# Patient Record
Sex: MALE
[De-identification: names, ages, dates, MRNs, and addresses within clinical notes are randomized per-mention and may not be internally consistent; named-entity substitution may affect disease eponyms.]

---

## 2017-06-27 PROBLEM — Z00.00 ENCOUNTER FOR PREVENTIVE HEALTH EXAMINATION: Status: ACTIVE | Noted: 2017-06-27

## 2017-07-10 ENCOUNTER — APPOINTMENT (OUTPATIENT)
Dept: HEMATOLOGY ONCOLOGY | Facility: CLINIC | Age: 82
End: 2017-07-10

## 2017-07-10 ENCOUNTER — OUTPATIENT (OUTPATIENT)
Dept: OUTPATIENT SERVICES | Facility: HOSPITAL | Age: 82
LOS: 1 days | Discharge: HOME | End: 2017-07-10

## 2017-07-10 DIAGNOSIS — C25.9 MALIGNANT NEOPLASM OF PANCREAS, UNSPECIFIED: ICD-10-CM

## 2017-07-11 ENCOUNTER — RESULT REVIEW (OUTPATIENT)
Age: 82
End: 2017-07-11

## 2017-07-14 ENCOUNTER — INPATIENT (INPATIENT)
Facility: HOSPITAL | Age: 82
LOS: 4 days | Discharge: HOME | End: 2017-07-19
Attending: INTERNAL MEDICINE | Admitting: INTERNAL MEDICINE
Payer: MEDICARE

## 2017-07-14 VITALS
OXYGEN SATURATION: 97 % | DIASTOLIC BLOOD PRESSURE: 65 MMHG | SYSTOLIC BLOOD PRESSURE: 127 MMHG | HEART RATE: 125 BPM | TEMPERATURE: 98 F | RESPIRATION RATE: 20 BRPM

## 2017-07-14 NOTE — ED ADULT TRIAGE NOTE - CHIEF COMPLAINT QUOTE
Pt arrives from home accompanied by his son. Pt states he was diagnosed with a blood clot in his left leg approx 1 month ago and was admitted to [facility] for a 5 day inpatient stay for same. Pt has been taking injectable Lovenox daily since discharge; states this past Wednesday left leg began swelling as well as left foot. Pt has tried elevating said extremity but swelling persists and has increased. Pts states he called pts PMD today and was told to go to ER to r/o blood clot in left leg. Pt denies pain; pts leg warm and dry with +pulse. Pt arrives from home accompanied by his son. Pt states he was diagnosed with a blood clot in his left leg approx 1 month ago and was admitted to [facility] for a 5 day inpatient stay for same. Pt has been taking injectable Lovenox daily since discharge; states this past Wednesday left leg began swelling as well as left foot. Pt has tried elevating said extremity but swelling persists and has increased. Pts states he called pts PMD today and was told to go to ER to r/o blood clot in left leg. Pt denies pain; pts leg warm and dry with +pulse. Pts son also states pt was diagnosed with stage IV pancreatic cancer last month.

## 2017-07-15 DIAGNOSIS — I26.99 OTHER PULMONARY EMBOLISM WITHOUT ACUTE COR PULMONALE: ICD-10-CM

## 2017-07-15 DIAGNOSIS — E11.9 TYPE 2 DIABETES MELLITUS WITHOUT COMPLICATIONS: ICD-10-CM

## 2017-07-15 DIAGNOSIS — H40.9 UNSPECIFIED GLAUCOMA: ICD-10-CM

## 2017-07-15 DIAGNOSIS — I82.409 ACUTE EMBOLISM AND THROMBOSIS OF UNSPECIFIED DEEP VEINS OF UNSPECIFIED LOWER EXTREMITY: ICD-10-CM

## 2017-07-15 DIAGNOSIS — I10 ESSENTIAL (PRIMARY) HYPERTENSION: ICD-10-CM

## 2017-07-15 DIAGNOSIS — C25.9 MALIGNANT NEOPLASM OF PANCREAS, UNSPECIFIED: ICD-10-CM

## 2017-07-15 DIAGNOSIS — E78.5 HYPERLIPIDEMIA, UNSPECIFIED: ICD-10-CM

## 2017-07-15 LAB
ALBUMIN SERPL ELPH-MCNC: 2.5 G/DL — LOW (ref 3.3–5)
ALBUMIN SERPL ELPH-MCNC: 2.7 G/DL — LOW (ref 3.3–5)
ALP SERPL-CCNC: 179 U/L — HIGH (ref 40–120)
ALP SERPL-CCNC: 184 U/L — HIGH (ref 40–120)
ALT FLD-CCNC: 24 U/L — SIGNIFICANT CHANGE UP (ref 4–41)
ALT FLD-CCNC: 25 U/L — SIGNIFICANT CHANGE UP (ref 4–41)
APPEARANCE UR: CLEAR — SIGNIFICANT CHANGE UP
APTT BLD: 29.8 SEC — SIGNIFICANT CHANGE UP (ref 27.5–37.4)
APTT BLD: 44.8 SEC — HIGH (ref 27.5–37.4)
APTT BLD: 50.5 SEC — HIGH (ref 27.5–37.4)
AST SERPL-CCNC: 28 U/L — SIGNIFICANT CHANGE UP (ref 4–40)
AST SERPL-CCNC: 28 U/L — SIGNIFICANT CHANGE UP (ref 4–40)
BASOPHILS # BLD AUTO: 0 K/UL — SIGNIFICANT CHANGE UP (ref 0–0.2)
BASOPHILS NFR BLD AUTO: 0 % — SIGNIFICANT CHANGE UP (ref 0–2)
BILIRUB SERPL-MCNC: 1 MG/DL — SIGNIFICANT CHANGE UP (ref 0.2–1.2)
BILIRUB SERPL-MCNC: 1.1 MG/DL — SIGNIFICANT CHANGE UP (ref 0.2–1.2)
BILIRUB UR-MCNC: NEGATIVE — SIGNIFICANT CHANGE UP
BLOOD UR QL VISUAL: SIGNIFICANT CHANGE UP
BUN SERPL-MCNC: 20 MG/DL — SIGNIFICANT CHANGE UP (ref 7–23)
BUN SERPL-MCNC: 22 MG/DL — SIGNIFICANT CHANGE UP (ref 7–23)
CALCIUM SERPL-MCNC: 8.5 MG/DL — SIGNIFICANT CHANGE UP (ref 8.4–10.5)
CALCIUM SERPL-MCNC: 8.8 MG/DL — SIGNIFICANT CHANGE UP (ref 8.4–10.5)
CHLORIDE SERPL-SCNC: 91 MMOL/L — LOW (ref 98–107)
CHLORIDE SERPL-SCNC: 94 MMOL/L — LOW (ref 98–107)
CHOLEST SERPL-MCNC: 103 MG/DL — LOW (ref 120–199)
CK MB BLD-MCNC: 1.65 NG/ML — SIGNIFICANT CHANGE UP (ref 1–6.6)
CK MB BLD-MCNC: 2.28 NG/ML — SIGNIFICANT CHANGE UP (ref 1–6.6)
CK MB BLD-MCNC: SIGNIFICANT CHANGE UP (ref 0–2.5)
CK MB BLD-MCNC: SIGNIFICANT CHANGE UP (ref 0–2.5)
CK SERPL-CCNC: 16 U/L — LOW (ref 30–200)
CK SERPL-CCNC: 29 U/L — LOW (ref 30–200)
CO2 SERPL-SCNC: 26 MMOL/L — SIGNIFICANT CHANGE UP (ref 22–31)
CO2 SERPL-SCNC: 29 MMOL/L — SIGNIFICANT CHANGE UP (ref 22–31)
COLOR SPEC: YELLOW — SIGNIFICANT CHANGE UP
CREAT SERPL-MCNC: 0.83 MG/DL — SIGNIFICANT CHANGE UP (ref 0.5–1.3)
CREAT SERPL-MCNC: 1.05 MG/DL — SIGNIFICANT CHANGE UP (ref 0.5–1.3)
EOSINOPHIL # BLD AUTO: 0 K/UL — SIGNIFICANT CHANGE UP (ref 0–0.5)
EOSINOPHIL NFR BLD AUTO: 0 % — SIGNIFICANT CHANGE UP (ref 0–6)
GLUCOSE SERPL-MCNC: 173 MG/DL — HIGH (ref 70–99)
GLUCOSE SERPL-MCNC: 278 MG/DL — HIGH (ref 70–99)
GLUCOSE UR-MCNC: NEGATIVE — SIGNIFICANT CHANGE UP
HBA1C BLD-MCNC: 8.1 % — HIGH (ref 4–5.6)
HCT VFR BLD CALC: 28 % — LOW (ref 39–50)
HCT VFR BLD CALC: 28.6 % — LOW (ref 39–50)
HCT VFR BLD CALC: 30.9 % — LOW (ref 39–50)
HDLC SERPL-MCNC: 30 MG/DL — LOW (ref 35–55)
HGB BLD-MCNC: 10 G/DL — LOW (ref 13–17)
HGB BLD-MCNC: 9 G/DL — LOW (ref 13–17)
HGB BLD-MCNC: 9.1 G/DL — LOW (ref 13–17)
IMM GRANULOCYTES # BLD AUTO: 0.11 # — SIGNIFICANT CHANGE UP
IMM GRANULOCYTES NFR BLD AUTO: 1.1 % — SIGNIFICANT CHANGE UP (ref 0–1.5)
INR BLD: 1.29 — HIGH (ref 0.88–1.17)
KETONES UR-MCNC: NEGATIVE — SIGNIFICANT CHANGE UP
LEUKOCYTE ESTERASE UR-ACNC: NEGATIVE — SIGNIFICANT CHANGE UP
LIPID PNL WITH DIRECT LDL SERPL: 57 MG/DL — SIGNIFICANT CHANGE UP
LYMPHOCYTES # BLD AUTO: 0.37 K/UL — LOW (ref 1–3.3)
LYMPHOCYTES # BLD AUTO: 3.8 % — LOW (ref 13–44)
MAGNESIUM SERPL-MCNC: 2.4 MG/DL — SIGNIFICANT CHANGE UP (ref 1.6–2.6)
MCHC RBC-ENTMCNC: 28.8 PG — SIGNIFICANT CHANGE UP (ref 27–34)
MCHC RBC-ENTMCNC: 29.5 PG — SIGNIFICANT CHANGE UP (ref 27–34)
MCHC RBC-ENTMCNC: 30.1 PG — SIGNIFICANT CHANGE UP (ref 27–34)
MCHC RBC-ENTMCNC: 31.5 % — LOW (ref 32–36)
MCHC RBC-ENTMCNC: 32.4 % — SIGNIFICANT CHANGE UP (ref 32–36)
MCHC RBC-ENTMCNC: 32.5 % — SIGNIFICANT CHANGE UP (ref 32–36)
MCV RBC AUTO: 90.9 FL — SIGNIFICANT CHANGE UP (ref 80–100)
MCV RBC AUTO: 91.4 FL — SIGNIFICANT CHANGE UP (ref 80–100)
MCV RBC AUTO: 93.1 FL — SIGNIFICANT CHANGE UP (ref 80–100)
MONOCYTES # BLD AUTO: 0.65 K/UL — SIGNIFICANT CHANGE UP (ref 0–0.9)
MONOCYTES NFR BLD AUTO: 6.7 % — SIGNIFICANT CHANGE UP (ref 2–14)
MUCOUS THREADS # UR AUTO: SIGNIFICANT CHANGE UP
NEUTROPHILS # BLD AUTO: 8.55 K/UL — HIGH (ref 1.8–7.4)
NEUTROPHILS NFR BLD AUTO: 88.4 % — HIGH (ref 43–77)
NITRITE UR-MCNC: NEGATIVE — SIGNIFICANT CHANGE UP
NRBC # FLD: 0 — SIGNIFICANT CHANGE UP
PH UR: 5.5 — SIGNIFICANT CHANGE UP (ref 4.6–8)
PHOSPHATE SERPL-MCNC: 4 MG/DL — SIGNIFICANT CHANGE UP (ref 2.5–4.5)
PLATELET # BLD AUTO: 233 K/UL — SIGNIFICANT CHANGE UP (ref 150–400)
PLATELET # BLD AUTO: 239 K/UL — SIGNIFICANT CHANGE UP (ref 150–400)
PLATELET # BLD AUTO: 266 K/UL — SIGNIFICANT CHANGE UP (ref 150–400)
PMV BLD: 9.6 FL — SIGNIFICANT CHANGE UP (ref 7–13)
PMV BLD: 9.7 FL — SIGNIFICANT CHANGE UP (ref 7–13)
PMV BLD: 9.8 FL — SIGNIFICANT CHANGE UP (ref 7–13)
POTASSIUM SERPL-MCNC: 3.8 MMOL/L — SIGNIFICANT CHANGE UP (ref 3.5–5.3)
POTASSIUM SERPL-MCNC: 4 MMOL/L — SIGNIFICANT CHANGE UP (ref 3.5–5.3)
POTASSIUM SERPL-SCNC: 3.8 MMOL/L — SIGNIFICANT CHANGE UP (ref 3.5–5.3)
POTASSIUM SERPL-SCNC: 4 MMOL/L — SIGNIFICANT CHANGE UP (ref 3.5–5.3)
PROT SERPL-MCNC: 5.6 G/DL — LOW (ref 6–8.3)
PROT SERPL-MCNC: 6.5 G/DL — SIGNIFICANT CHANGE UP (ref 6–8.3)
PROT UR-MCNC: 10 — SIGNIFICANT CHANGE UP
PROTHROM AB SERPL-ACNC: 14.5 SEC — HIGH (ref 9.8–13.1)
RBC # BLD: 3.08 M/UL — LOW (ref 4.2–5.8)
RBC # BLD: 3.13 M/UL — LOW (ref 4.2–5.8)
RBC # BLD: 3.32 M/UL — LOW (ref 4.2–5.8)
RBC # FLD: 12.9 % — SIGNIFICANT CHANGE UP (ref 10.3–14.5)
RBC # FLD: 13 % — SIGNIFICANT CHANGE UP (ref 10.3–14.5)
RBC # FLD: 13.1 % — SIGNIFICANT CHANGE UP (ref 10.3–14.5)
RBC CASTS # UR COMP ASSIST: HIGH (ref 0–?)
SODIUM SERPL-SCNC: 132 MMOL/L — LOW (ref 135–145)
SODIUM SERPL-SCNC: 137 MMOL/L — SIGNIFICANT CHANGE UP (ref 135–145)
SP GR SPEC: 1.02 — SIGNIFICANT CHANGE UP (ref 1–1.03)
T3 SERPL-MCNC: 123 NG/DL — SIGNIFICANT CHANGE UP (ref 80–200)
T4 FREE SERPL-MCNC: 1.78 NG/DL — SIGNIFICANT CHANGE UP (ref 0.9–1.8)
TRIGL SERPL-MCNC: 67 MG/DL — SIGNIFICANT CHANGE UP (ref 10–149)
TROPONIN T SERPL-MCNC: < 0.06 NG/ML — SIGNIFICANT CHANGE UP (ref 0–0.06)
TROPONIN T SERPL-MCNC: < 0.06 NG/ML — SIGNIFICANT CHANGE UP (ref 0–0.06)
TSH SERPL-MCNC: 0.01 UIU/ML — LOW (ref 0.27–4.2)
UROBILINOGEN FLD QL: NORMAL E.U. — SIGNIFICANT CHANGE UP (ref 0.1–0.2)
WBC # BLD: 8.75 K/UL — SIGNIFICANT CHANGE UP (ref 3.8–10.5)
WBC # BLD: 9.49 K/UL — SIGNIFICANT CHANGE UP (ref 3.8–10.5)
WBC # BLD: 9.68 K/UL — SIGNIFICANT CHANGE UP (ref 3.8–10.5)
WBC # FLD AUTO: 8.75 K/UL — SIGNIFICANT CHANGE UP (ref 3.8–10.5)
WBC # FLD AUTO: 9.49 K/UL — SIGNIFICANT CHANGE UP (ref 3.8–10.5)
WBC # FLD AUTO: 9.68 K/UL — SIGNIFICANT CHANGE UP (ref 3.8–10.5)
WBC UR QL: SIGNIFICANT CHANGE UP (ref 0–?)

## 2017-07-15 PROCEDURE — 99223 1ST HOSP IP/OBS HIGH 75: CPT | Mod: GC

## 2017-07-15 PROCEDURE — 74177 CT ABD & PELVIS W/CONTRAST: CPT | Mod: 26

## 2017-07-15 PROCEDURE — 73701 CT LOWER EXTREMITY W/DYE: CPT | Mod: 26,LT

## 2017-07-15 PROCEDURE — 71275 CT ANGIOGRAPHY CHEST: CPT | Mod: 26

## 2017-07-15 PROCEDURE — 70470 CT HEAD/BRAIN W/O & W/DYE: CPT | Mod: 26

## 2017-07-15 RX ORDER — HEPARIN SODIUM 5000 [USP'U]/ML
3000 INJECTION INTRAVENOUS; SUBCUTANEOUS EVERY 6 HOURS
Qty: 0 | Refills: 0 | Status: DISCONTINUED | OUTPATIENT
Start: 2017-07-15 | End: 2017-07-16

## 2017-07-15 RX ORDER — INSULIN GLARGINE 100 [IU]/ML
28 INJECTION, SOLUTION SUBCUTANEOUS AT BEDTIME
Qty: 0 | Refills: 0 | Status: DISCONTINUED | OUTPATIENT
Start: 2017-07-15 | End: 2017-07-15

## 2017-07-15 RX ORDER — OXYCODONE AND ACETAMINOPHEN 5; 325 MG/1; MG/1
1 TABLET ORAL EVERY 8 HOURS
Qty: 0 | Refills: 0 | Status: DISCONTINUED | OUTPATIENT
Start: 2017-07-15 | End: 2017-07-19

## 2017-07-15 RX ORDER — INSULIN LISPRO 100/ML
6 VIAL (ML) SUBCUTANEOUS
Qty: 0 | Refills: 0 | Status: DISCONTINUED | OUTPATIENT
Start: 2017-07-15 | End: 2017-07-16

## 2017-07-15 RX ORDER — PANTOPRAZOLE SODIUM 20 MG/1
40 TABLET, DELAYED RELEASE ORAL
Qty: 0 | Refills: 0 | Status: DISCONTINUED | OUTPATIENT
Start: 2017-07-15 | End: 2017-07-19

## 2017-07-15 RX ORDER — INSULIN LISPRO 100/ML
VIAL (ML) SUBCUTANEOUS AT BEDTIME
Qty: 0 | Refills: 0 | Status: DISCONTINUED | OUTPATIENT
Start: 2017-07-15 | End: 2017-07-19

## 2017-07-15 RX ORDER — INSULIN DETEMIR 100/ML (3)
28 INSULIN PEN (ML) SUBCUTANEOUS
Qty: 0 | Refills: 0 | COMMUNITY

## 2017-07-15 RX ORDER — SODIUM CHLORIDE 9 MG/ML
1000 INJECTION, SOLUTION INTRAVENOUS
Qty: 0 | Refills: 0 | Status: DISCONTINUED | OUTPATIENT
Start: 2017-07-15 | End: 2017-07-19

## 2017-07-15 RX ORDER — MORPHINE SULFATE 50 MG/1
30 CAPSULE, EXTENDED RELEASE ORAL EVERY 12 HOURS
Qty: 0 | Refills: 0 | Status: DISCONTINUED | OUTPATIENT
Start: 2017-07-15 | End: 2017-07-16

## 2017-07-15 RX ORDER — HEPARIN SODIUM 5000 [USP'U]/ML
INJECTION INTRAVENOUS; SUBCUTANEOUS
Qty: 25000 | Refills: 0 | Status: DISCONTINUED | OUTPATIENT
Start: 2017-07-15 | End: 2017-07-16

## 2017-07-15 RX ORDER — LISINOPRIL 2.5 MG/1
10 TABLET ORAL DAILY
Qty: 0 | Refills: 0 | Status: DISCONTINUED | OUTPATIENT
Start: 2017-07-15 | End: 2017-07-19

## 2017-07-15 RX ORDER — BRIMONIDINE TARTRATE, TIMOLOL MALEATE 2; 5 MG/ML; MG/ML
1 SOLUTION/ DROPS OPHTHALMIC
Qty: 0 | Refills: 0 | COMMUNITY

## 2017-07-15 RX ORDER — ACETAZOLAMIDE 250 MG/1
500 TABLET ORAL DAILY
Qty: 0 | Refills: 0 | Status: DISCONTINUED | OUTPATIENT
Start: 2017-07-15 | End: 2017-07-16

## 2017-07-15 RX ORDER — PILOCARPINE HCL 4 %
2 DROPS OPHTHALMIC (EYE)
Qty: 0 | Refills: 0 | COMMUNITY

## 2017-07-15 RX ORDER — DEXTROSE 50 % IN WATER 50 %
25 SYRINGE (ML) INTRAVENOUS ONCE
Qty: 0 | Refills: 0 | Status: DISCONTINUED | OUTPATIENT
Start: 2017-07-15 | End: 2017-07-19

## 2017-07-15 RX ORDER — MORPHINE SULFATE 50 MG/1
1 CAPSULE, EXTENDED RELEASE ORAL
Qty: 0 | Refills: 0 | COMMUNITY

## 2017-07-15 RX ORDER — LINACLOTIDE 145 UG/1
1 CAPSULE, GELATIN COATED ORAL
Qty: 0 | Refills: 0 | COMMUNITY

## 2017-07-15 RX ORDER — TIMOLOL 0.5 %
1 DROPS OPHTHALMIC (EYE)
Qty: 0 | Refills: 0 | Status: DISCONTINUED | OUTPATIENT
Start: 2017-07-15 | End: 2017-07-16

## 2017-07-15 RX ORDER — DOXAZOSIN MESYLATE 4 MG
2 TABLET ORAL AT BEDTIME
Qty: 0 | Refills: 0 | Status: DISCONTINUED | OUTPATIENT
Start: 2017-07-15 | End: 2017-07-19

## 2017-07-15 RX ORDER — METOCLOPRAMIDE HCL 10 MG
10 TABLET ORAL THREE TIMES A DAY
Qty: 0 | Refills: 0 | Status: DISCONTINUED | OUTPATIENT
Start: 2017-07-15 | End: 2017-07-18

## 2017-07-15 RX ORDER — BRIMONIDINE TARTRATE 2 MG/MG
1 SOLUTION/ DROPS OPHTHALMIC
Qty: 0 | Refills: 0 | COMMUNITY

## 2017-07-15 RX ORDER — DEXTROSE 50 % IN WATER 50 %
1 SYRINGE (ML) INTRAVENOUS ONCE
Qty: 0 | Refills: 0 | Status: DISCONTINUED | OUTPATIENT
Start: 2017-07-15 | End: 2017-07-19

## 2017-07-15 RX ORDER — SENNA PLUS 8.6 MG/1
2 TABLET ORAL AT BEDTIME
Qty: 0 | Refills: 0 | Status: DISCONTINUED | OUTPATIENT
Start: 2017-07-15 | End: 2017-07-19

## 2017-07-15 RX ORDER — DOXAZOSIN MESYLATE 4 MG
1 TABLET ORAL
Qty: 0 | Refills: 0 | COMMUNITY

## 2017-07-15 RX ORDER — BRIMONIDINE TARTRATE 2 MG/MG
1 SOLUTION/ DROPS OPHTHALMIC
Qty: 0 | Refills: 0 | Status: DISCONTINUED | OUTPATIENT
Start: 2017-07-15 | End: 2017-07-16

## 2017-07-15 RX ORDER — AMLODIPINE BESYLATE 2.5 MG/1
5 TABLET ORAL DAILY
Qty: 0 | Refills: 0 | Status: DISCONTINUED | OUTPATIENT
Start: 2017-07-15 | End: 2017-07-19

## 2017-07-15 RX ORDER — INSULIN LISPRO 100/ML
VIAL (ML) SUBCUTANEOUS
Qty: 0 | Refills: 0 | Status: DISCONTINUED | OUTPATIENT
Start: 2017-07-15 | End: 2017-07-19

## 2017-07-15 RX ORDER — INSULIN GLARGINE 100 [IU]/ML
45 INJECTION, SOLUTION SUBCUTANEOUS EVERY MORNING
Qty: 0 | Refills: 0 | Status: DISCONTINUED | OUTPATIENT
Start: 2017-07-15 | End: 2017-07-15

## 2017-07-15 RX ORDER — HYDROCHLOROTHIAZIDE 25 MG
12.5 TABLET ORAL DAILY
Qty: 0 | Refills: 0 | Status: DISCONTINUED | OUTPATIENT
Start: 2017-07-15 | End: 2017-07-19

## 2017-07-15 RX ORDER — ACETAMINOPHEN 500 MG
650 TABLET ORAL EVERY 6 HOURS
Qty: 0 | Refills: 0 | Status: DISCONTINUED | OUTPATIENT
Start: 2017-07-15 | End: 2017-07-19

## 2017-07-15 RX ORDER — ACETAZOLAMIDE 250 MG/1
1 TABLET ORAL
Qty: 0 | Refills: 0 | COMMUNITY

## 2017-07-15 RX ORDER — AMLODIPINE BESYLATE AND BENAZEPRIL HYDROCHLORIDE 10; 20 MG/1; MG/1
1 CAPSULE ORAL
Qty: 0 | Refills: 0 | COMMUNITY

## 2017-07-15 RX ORDER — INSULIN DETEMIR 100/ML (3)
45 INSULIN PEN (ML) SUBCUTANEOUS
Qty: 0 | Refills: 0 | COMMUNITY

## 2017-07-15 RX ORDER — FAMOTIDINE 10 MG/ML
20 INJECTION INTRAVENOUS DAILY
Qty: 0 | Refills: 0 | Status: DISCONTINUED | OUTPATIENT
Start: 2017-07-15 | End: 2017-07-19

## 2017-07-15 RX ORDER — DOCUSATE SODIUM 100 MG
100 CAPSULE ORAL THREE TIMES A DAY
Qty: 0 | Refills: 0 | Status: DISCONTINUED | OUTPATIENT
Start: 2017-07-15 | End: 2017-07-19

## 2017-07-15 RX ORDER — HEPARIN SODIUM 5000 [USP'U]/ML
6500 INJECTION INTRAVENOUS; SUBCUTANEOUS EVERY 6 HOURS
Qty: 0 | Refills: 0 | Status: DISCONTINUED | OUTPATIENT
Start: 2017-07-15 | End: 2017-07-16

## 2017-07-15 RX ORDER — DEXTROSE 50 % IN WATER 50 %
12.5 SYRINGE (ML) INTRAVENOUS ONCE
Qty: 0 | Refills: 0 | Status: DISCONTINUED | OUTPATIENT
Start: 2017-07-15 | End: 2017-07-19

## 2017-07-15 RX ORDER — PILOCARPINE HCL 4 %
2 DROPS OPHTHALMIC (EYE)
Qty: 0 | Refills: 0 | Status: DISCONTINUED | OUTPATIENT
Start: 2017-07-15 | End: 2017-07-16

## 2017-07-15 RX ORDER — METOCLOPRAMIDE HCL 10 MG
1 TABLET ORAL
Qty: 0 | Refills: 0 | COMMUNITY

## 2017-07-15 RX ORDER — HEPARIN SODIUM 5000 [USP'U]/ML
6500 INJECTION INTRAVENOUS; SUBCUTANEOUS ONCE
Qty: 0 | Refills: 0 | Status: COMPLETED | OUTPATIENT
Start: 2017-07-15 | End: 2017-07-15

## 2017-07-15 RX ORDER — GLUCAGON INJECTION, SOLUTION 0.5 MG/.1ML
1 INJECTION, SOLUTION SUBCUTANEOUS ONCE
Qty: 0 | Refills: 0 | Status: DISCONTINUED | OUTPATIENT
Start: 2017-07-15 | End: 2017-07-19

## 2017-07-15 RX ORDER — OMEPRAZOLE 10 MG/1
1 CAPSULE, DELAYED RELEASE ORAL
Qty: 0 | Refills: 0 | COMMUNITY

## 2017-07-15 RX ORDER — RANITIDINE HYDROCHLORIDE 150 MG/1
1 TABLET, FILM COATED ORAL
Qty: 0 | Refills: 0 | COMMUNITY

## 2017-07-15 RX ADMIN — MORPHINE SULFATE 30 MILLIGRAM(S): 50 CAPSULE, EXTENDED RELEASE ORAL at 18:09

## 2017-07-15 RX ADMIN — ACETAZOLAMIDE 500 MILLIGRAM(S): 250 TABLET ORAL at 12:51

## 2017-07-15 RX ADMIN — HEPARIN SODIUM 1900 UNIT(S)/HR: 5000 INJECTION INTRAVENOUS; SUBCUTANEOUS at 18:16

## 2017-07-15 RX ADMIN — AMLODIPINE BESYLATE 5 MILLIGRAM(S): 2.5 TABLET ORAL at 09:56

## 2017-07-15 RX ADMIN — Medication 12.5 MILLIGRAM(S): at 09:56

## 2017-07-15 RX ADMIN — Medication 2 DROP(S): at 12:51

## 2017-07-15 RX ADMIN — HEPARIN SODIUM 1500 UNIT(S)/HR: 5000 INJECTION INTRAVENOUS; SUBCUTANEOUS at 04:28

## 2017-07-15 RX ADMIN — HEPARIN SODIUM 6500 UNIT(S): 5000 INJECTION INTRAVENOUS; SUBCUTANEOUS at 04:28

## 2017-07-15 RX ADMIN — MORPHINE SULFATE 30 MILLIGRAM(S): 50 CAPSULE, EXTENDED RELEASE ORAL at 18:39

## 2017-07-15 RX ADMIN — Medication 2 DROP(S): at 18:09

## 2017-07-15 RX ADMIN — SENNA PLUS 2 TABLET(S): 8.6 TABLET ORAL at 22:32

## 2017-07-15 RX ADMIN — PANTOPRAZOLE SODIUM 40 MILLIGRAM(S): 20 TABLET, DELAYED RELEASE ORAL at 09:56

## 2017-07-15 RX ADMIN — Medication 2: at 12:51

## 2017-07-15 RX ADMIN — LISINOPRIL 10 MILLIGRAM(S): 2.5 TABLET ORAL at 09:56

## 2017-07-15 RX ADMIN — FAMOTIDINE 20 MILLIGRAM(S): 10 INJECTION INTRAVENOUS at 12:51

## 2017-07-15 RX ADMIN — Medication 2 MILLIGRAM(S): at 22:32

## 2017-07-15 RX ADMIN — INSULIN GLARGINE 45 UNIT(S): 100 INJECTION, SOLUTION SUBCUTANEOUS at 10:28

## 2017-07-15 RX ADMIN — BRIMONIDINE TARTRATE 1 DROP(S): 2 SOLUTION/ DROPS OPHTHALMIC at 18:10

## 2017-07-15 RX ADMIN — HEPARIN SODIUM 1700 UNIT(S)/HR: 5000 INJECTION INTRAVENOUS; SUBCUTANEOUS at 11:27

## 2017-07-15 RX ADMIN — Medication 100 MILLIGRAM(S): at 22:32

## 2017-07-15 RX ADMIN — Medication 100 MILLIGRAM(S): at 15:05

## 2017-07-15 RX ADMIN — Medication 1 DROP(S): at 18:09

## 2017-07-15 NOTE — CONSULT NOTE ADULT - ATTENDING COMMENTS
pt seen and examined. Extensively metastatic disease- likely pancreatic adeno based on liver bx. HAs received care at [facility] and oncologist does not come to [facility]. As per history obtained, pt was recently diagnosed with panc ca and was in the process of starting tx but was waiting for insurance approval. He was noted to have DVT/PE and started on AC. Pt states he was taking LMWH at home but does not remember dose of frequency. Now admitted with b/l LE pain , worsening swelling and dyspnea. c/o abd discomfort.  OE: patient appears sick  with nasal O2  left eye corneal opacity  Propped up position- due to abd discomfort.   b/ LE swelling. left>>>rt.  Please obtain all records  BAsed on available records and today's exam, and imaging studies- extensive disease burden with extensive VTE.  Please consult vascular  A/w UFH for AC  Palliative care consult for symptom management and GOC discussion.   Poor prognosis. Pt does not appear to be a candidate for cancer-directed systemic tx at this time.

## 2017-07-15 NOTE — H&P ADULT - PROBLEM SELECTOR PLAN 1
ekg/telemetry, heparin gtt started by ED, f/u ce x 2, echo to evaluate heart strain, consider cardio c/s

## 2017-07-15 NOTE — CONSULT NOTE ADULT - ASSESSMENT
81M PMH recently diagnosed pancreatic adenocarcinoma with metatastasis to the liver, duodenum and possibly thyroid, and recent diagnosis of DVT/PE presents with left leg swelling and SOB
Assessment  DMT2: 81y Male with DM T2 with hyperglycemia on large dose basal insulin twice daily, poor po intake, sugars trending down,  blood sugars running high, non compliant with low carb diet.  HTN: uncontrolled on meds.  HLD:  on statin, tolerating.  Pulmonary embolism: on treatment.

## 2017-07-15 NOTE — H&P ADULT - NSHPPHYSICALEXAM_GEN_ALL_CORE
Vital Signs Last 24 Hrs  T(C): 37.1 (15 Jul 2017 06:44), Max: 37.1 (15 Jul 2017 06:44)  T(F): 98.8 (15 Jul 2017 06:44), Max: 98.8 (15 Jul 2017 06:44)  HR: 120 (15 Jul 2017 06:44) (117 - 125)  BP: 143/79 (15 Jul 2017 06:44) (127/65 - 153/65)  BP(mean): --  RR: 22 (15 Jul 2017 06:44) (20 - 35)  SpO2: 100% (15 Jul 2017 06:44) (96% - 100%)    EKG: Sinus Tach @ 124, PVC's, T inv III

## 2017-07-15 NOTE — H&P ADULT - ASSESSMENT
82 y/o male, with a PmHx of DM, HTN, HLD, Glaucoma, Pancreatic Ca (Stage IV diagnosed 1 month ago), PE/DVT on Lovenox, is being admitted to telemetry for extensive DVT's and PE's with pulmonary infarct and right heart strain and metastatic Pancreatic Ca.

## 2017-07-15 NOTE — ED PROVIDER NOTE - ATTENDING CONTRIBUTION TO CARE
Dr. Henry: I have personally performed a face to face bedside history and physical examination of this patient. I have discussed the history, examination, review of systems, assessment and plan of management with the resident. I have reviewed the electronic medical record and amended it to reflect my history, review of systems, physical exam, assessment and plan.  81M h/o DM, HTN, HLD, recently dx'ed stage IV pancreatic ca (dx'ed 1 mo ago), LLE DVT, bilateral PEs, on lovenox p/w worsening LLE swelling and HYLTON. Denies cp.  On exam pt appears well, nad, tachy, regular, ctab, abdo soft/nt/nd, LLE 2+ pitting edema, no calf ttp.  Plan - CTA chest/a/p LLE r/o worsening dvt/pe, Shirley, r/o right heart strain, admit Dr. Henry: I have personally performed a face to face bedside history and physical examination of this patient. I have discussed the history, examination, review of systems, assessment and plan of management with the resident. I have reviewed the electronic medical record and amended it to reflect my history, review of systems, physical exam, assessment and plan.  81M h/o DM, HTN, HLD, recently dx'ed stage IV pancreatic ca (dx'ed 1 mo ago), LLE DVT, bilateral PEs, on lovenox p/w worsening LLE swelling and HYLTON. Denies cp.  On exam pt appears well, nad, tachy, regular, ctab, abdo soft/nt/nd, LLE 2+ pitting edema, no calf ttp.  Plan - CTA chest/a/p LLE r/o worsening dvt/pe, Shirley, r/o right heart strain, admit. Despite tachycardia, pt appears well, nad, nml bp, no stat echo necessary

## 2017-07-15 NOTE — PATIENT PROFILE ADULT. - VISION (WITH CORRECTIVE LENSES IF THE PATIENT USUALLY WEARS THEM):
L eye blindness/Partially impaired: cannot see medication labels or newsprint, but can see obstacles in path, and the surrounding layout; can count fingers at arm's length

## 2017-07-15 NOTE — ED PROVIDER NOTE - OBJECTIVE STATEMENT
80 y/o M with h/o DM, HTN, HLD and recently diagnosed stage IV pancreatic CA and DVT/PE on lovenox presents with worsening LLE swelling. Patient previously did not have significant swelling to LLE when DVT/PE was diagnosed one month ago and has had gradual swelling developing over the last three days that is extending up to the thigh. Patient reports that he has had worsening HYLTON since having PE. Now unable to walk from room to room in his home whereas was previously able to walk blocks without stopping.

## 2017-07-15 NOTE — ED PROVIDER NOTE - CRITICAL CARE PROVIDED
additional history taking/consultation with other physicians/direct patient care (not related to procedure)/interpretation of diagnostic studies/documentation

## 2017-07-15 NOTE — H&P ADULT - FAMILY HISTORY
Father  Still living? No  Family history of diabetes mellitus, Age at diagnosis: Age Unknown  Family history of hypertension, Age at diagnosis: Age Unknown

## 2017-07-15 NOTE — ED ADULT NURSE NOTE - OBJECTIVE STATEMENT
pt on bed aox3 reports left upper leg swelling worsening and right upper arm swelling. son reports pt was seen and admitted to hospital r/o clot on left leg and on lovenox and DC few weeks ago. now noticed the swelling not subsiding and called PMD and advised to go to ED for further eval. pitting edema noted on BL:E worse on the left on cm sinus tachy with PVC MD at bedside eval the pt. will continue to monitor

## 2017-07-15 NOTE — ED PROVIDER NOTE - PROGRESS NOTE DETAILS
Patient was evaluated at [facility] but is not officially a [facility] patient because he does not have insurance. For the reason he was not admitted under A team service. Patient will be placed on telemetry for R heart strain and persistent tachycardia.

## 2017-07-15 NOTE — CONSULT NOTE ADULT - SUBJECTIVE AND OBJECTIVE BOX
HPI:  80 y/o male, with a PmHx of DM, HTN, HLD, Gerd, BPH, Glaucoma, Pancreatic Ca (Stage IV diagnosed 1 month ago), PE/DVT on Lovenox, presents with worsening LLE swelling and SOB. Pt states he has been having bilateral LE swelling for the past few weeks but over the past few days he started to get worsening swelling with increased pain (L > R) and sob with movement. He was started on Lovenox recently for the DVT/PE but he couldn't take the pain anymore so they came to [facility] ED today for an evaluation. He states he was diagnosed with Pancreatic Ca about a month ago after losing a little weight for the past few months and then having lower back pain and poor appetite he had gone to his PMD's office and found to have Pancreatic Ca and was referred to an Oncologist Dr. Emery. He states he hasn't started on chemo yet because he is waiting for his insurance to cover the medications. Today, in the ED, he had a CTA chest/abd/pelvis and LE's and was found to have bilateral PE's with possible pulmonary infarcts with evidence of right heart strain and extensive DVT's. He was also found to have Pancreatic Ca with mets to Liver, Thyroid and Duodenum. Had a discussion with him and his wife about DNR/DNI but he wants everything done. Pt is being admitted to telemetry for further medical management. (15 Jul 2017 08:44)    Patient has history of diabetes, on large dose Lantus twice daily, gets hypoglycemia according to his daughter, insulin at home, no recent hypoglycemic episodes, no polyuria polydipsia. Patient follows up with PCP for diabetes management.    PAST MEDICAL & SURGICAL HISTORY:  BPH (benign prostatic hypertrophy)  GERD (gastroesophageal reflux disease)  DVT (deep venous thrombosis)  Pulmonary embolism  Glaucoma  Pancreatic cancer metastasized to liver  Hyperlipidemia  Hypertension  Diabetes mellitus  No significant past surgical history      FAMILY HISTORY:  Family history of hypertension (Father): Father  Family history of diabetes mellitus (Father): Mother      Social History:    Outpatient Medications:    MEDICATIONS  (STANDING):  heparin  Infusion.  Unit(s)/Hr (15 mL/Hr) IV Continuous <Continuous>  morphine ER Tablet 30 milliGRAM(s) Oral every 12 hours  doxazosin 2 milliGRAM(s) Oral at bedtime  amLODIPine   Tablet 5 milliGRAM(s) Oral daily  lisinopril 10 milliGRAM(s) Oral daily  acetazolamide   ER Capsule 500 milliGRAM(s) Oral daily  hydrochlorothiazide 12.5 milliGRAM(s) Oral daily  famotidine    Tablet 20 milliGRAM(s) Oral daily  brimonidine 0.2% Ophthalmic Solution 1 Drop(s) Both EYES two times a day  timolol 0.5% Solution 1 Drop(s) Both EYES two times a day  pilocarpine 4% Solution 2 Drop(s) Both EYES four times a day  pantoprazole    Tablet 40 milliGRAM(s) Oral before breakfast  insulin lispro (HumaLOG) corrective regimen sliding scale   SubCutaneous three times a day before meals  insulin lispro (HumaLOG) corrective regimen sliding scale   SubCutaneous at bedtime  dextrose 5%. 1000 milliLiter(s) (50 mL/Hr) IV Continuous <Continuous>  dextrose 50% Injectable 12.5 Gram(s) IV Push once  dextrose 50% Injectable 25 Gram(s) IV Push once  dextrose 50% Injectable 25 Gram(s) IV Push once  docusate sodium 100 milliGRAM(s) Oral three times a day  senna 2 Tablet(s) Oral at bedtime  insulin lispro Injectable (HumaLOG) 6 Unit(s) SubCutaneous three times a day before meals    MEDICATIONS  (PRN):  heparin  Injectable 6500 Unit(s) IV Push every 6 hours PRN For aPTT less than 40  heparin  Injectable 3000 Unit(s) IV Push every 6 hours PRN For aPTT between 40 - 57  oxyCODONE    5 mG/acetaminophen 325 mG 1 Tablet(s) Oral every 8 hours PRN Severe Pain (7 - 10)  metoclopramide 10 milliGRAM(s) Oral three times a day PRN nausea  acetaminophen   Tablet. 650 milliGRAM(s) Oral every 6 hours PRN mild, moderate pain  dextrose Gel 1 Dose(s) Oral once PRN Blood Glucose LESS THAN 70 milliGRAM(s)/deciliter  glucagon  Injectable 1 milliGRAM(s) IntraMuscular once PRN Glucose LESS THAN 70 milligrams/deciliter      Allergies    penicillin (Hives)    Intolerances      Review of Systems:  Constitutional: No fever, no chills  Eyes: No blurry vision  Neuro: No tremors  HEENT: No pain, no neck swelling  Cardiovascular: No chest pain, no palpitations  Respiratory: Has SOB, no cough  GI: No nausea, vomiting, abdominal pain  : No dysuria  Skin: no rash  MSK: Has leg swelling, no foot ulcers.  Psych: no depression  Endocrine: no polyuria, polydipsia    ALL OTHER SYSTEMS REVIEWED AND NEGATIVE    UNABLE TO OBTAIN    PHYSICAL EXAM:  VITALS: T(C): 37.2 (07-15-17 @ 18:10)  T(F): 99 (07-15-17 @ 18:10), Max: 99 (07-15-17 @ 09:47)  HR: 111 (07-15-17 @ 18:10) (111 - 135)  BP: 127/67 (07-15-17 @ 18:10) (108/52 - 153/65)  RR:  (16 - 35)  SpO2:  (96% - 100%)  Wt(kg): --  GENERAL: NAD, well-groomed, well-developed  EYES: No proptosis, no lid lag  HEENT:  Atraumatic, Normocephalic  THYROID: Normal size, no palpable nodules  RESPIRATORY: Clear to auscultation bilaterally; No rales, rhonchi, wheezing  CARDIOVASCULAR: Si S2, No murmurs;  GI: Soft, non distended, normal bowel sounds  SKIN: Dry, intact, No rashes or lesions  MUSCULOSKELETAL: Has BL lower extremity edema.  NEURO:  no tremor, sensation decreased in feet BL,    CAPILLARY BLOOD GLUCOSE  106 (07-15 @ 16:40)  189 (07-15 @ 12:22)  184 (07-15 @ 08:33)                            9.1    8.75  )-----------( 233      ( 15 Jul 2017 10:09 )             28.0       07-15    137  |  94<L>  |  20  ----------------------------<  173<H>  4.0   |  29  |  0.83    EGFR if : 96  EGFR if non : 83    Ca    8.5      07-15  Mg     2.4     07-15  Phos  4.0     07-15    TPro  5.6<L>  /  Alb  2.5<L>  /  TBili  1.1  /  DBili  x   /  AST  28  /  ALT  25  /  AlkPhos  179<H>  07-15      Thyroid Function Tests:  07-15 @ 08:00 TSH 0.01 FreeT4 1.78 T3 123.0 Anti TPO -- Anti Thyroglobulin Ab -- TSI --      Hemoglobin A1C, Whole Blood: 8.1 % <H> [4.0 - 5.6] (07-15-17 @ 08:00)      07-15 Chol 103<L> LDL 57 HDL 30<L> Trig 67    Radiology:
80 y/o male with PMH Stage IV Pancreatic Cancer diagnosed 1 month ago, Hx DVT/PE, HTN, DM, GERD, BPH  who presents with worsening LLE swelling and pain as well as HYLTON. Patient was recently admitted to [facility] and diagnosed with pancreatic adenocarcinoma (pathology available in [place]), Per patient he has been compliant with his lovenox, although they cannot recall the dose. Per family he is not currently able to walk without assistance.    On admission patient went for a CT Chest/Abdomen/Pelvis which showed bilateral pulmonary emboli with evidence of right heart strain, small peripheral airspace opaicty in lateral left lower lobe suspicious for pulmonary infarct, an enlarged thyroid with intrathoracic extension of thyroid mass. necrotic mass in the uncinate process of pancreat, invading the duodenum, hepatic metastases, and extensive thrombus in the left external iliac vein, extending to the left common femoral and proximal femoral veins with marked resultant soft tissue swelling and subcutaneous inflammatory change.     ROS otherwise negative except as per HPI    PMH: Pancreatic adenocarcinoma, HTN, HLD, GERD, DVT/PE, DM, BPH  PSH: no significant surgical hx  Family hx: noncontributory  Social history: retired, denies tobacco, alcohol, illicit drug use    Allergies: penicillin    Vital Signs: T: 97.6 P: 115 BP: 113/55 RR: 16 O2-sat: 99% on O2 via NC  Gen: chronically ill appearing, kyphotic  HEENT: left-side cataracts  Cardio: +S1, +S2, no M/R/G  Lungs: CTA anteriorly  Abdomen: Soft, nontender, normoactive BS  Ext: markedly enlarged LLE compared to right with 2-3+ pitting edema, intact pedal pulse  Neuro: A & O X 3, CN grossly intact      hb = 9.1  Albumin = 2.5  ALP = 179

## 2017-07-15 NOTE — CONSULT NOTE ADULT - PROBLEM SELECTOR RECOMMENDATION 2
Continue treatment, primary team FU
- c/w heparin as you are  - consult vascular surgery  - please confirm with pharmacy the dose of lovenox he was taking to assess if this is treatment failure vs. subtherapeutic dosing

## 2017-07-15 NOTE — CONSULT NOTE ADULT - PROBLEM SELECTOR RECOMMENDATION 3
On meds primary team following up
- please obtain radiographic imaging studies from [facility] to compare the CT A/P performed yesterday and assess for interval increase in clot burden  - consult vascular surgery to assess for role for intravascular thrombolysis   - t/c IVC filter  - carefully monitor his left lower extremity for neurovascular compromise    Jas Lewis, PGY4  Hematology/Oncology Fellow

## 2017-07-15 NOTE — ED PROVIDER NOTE - MEDICAL DECISION MAKING DETAILS
80 y/o M with DVT/PE on lovenox and metastatic pancreatic CA presents with worsening LLE swelling and HYLTON. Tachycardic and tachypneic at rest. Concern for worsening PE causing R heart strain. CTA chest and CT a/p +CT LLE to eval for extensive DVT possible extending to iliacs

## 2017-07-15 NOTE — CONSULT NOTE ADULT - PROBLEM SELECTOR RECOMMENDATION 9
Will DC lantus for now, will start Humalog 6u before each meal, will add basal insulin tomorrow, will FU
- Palliative Care consult to assess goals of care given his very advanced disease  - he should follow up with his oncologist at [facility] or he can f/u at the [facility] if he is interested on discharge if he wishes to pursue treatment

## 2017-07-15 NOTE — H&P ADULT - HISTORY OF PRESENT ILLNESS
80 y/o male, with a PmHx of DM, HTN, HLD, Gerd, BPH, Glaucoma, Pancreatic Ca (Stage IV diagnosed 1 month ago), PE/DVT on Lovenox, presents with worsening LLE swelling and SOB. Pt states he has been having bilateral LE swelling for the past few weeks but over the past few days he started to get worsening swelling with increased pain (L > R) and sob with movement. He was started on Lovenox recently for the DVT/PE but he couldn't take the pain anymore so they came to [facility] ED today for an evaluation. He states he was diagnosed with Pancreatic Ca about a month ago after losing a little weight for the past few months and then having lower back pain and poor appetite he had gone to his PMD's office and found to have Pancreatic Ca and was referred to an Oncologist Dr. Emery. He states he hasn't started on chemo yet because he is waiting for his insurance to cover the medications. Today, in the ED, he had a CTA chest/abd/pelvis and LE's and was found to have bilateral PE's with possible pulmonary infarcts with evidence of right heart strain and extensive DVT's. He was also found to have Pancreatic Ca with mets to Liver, Thyroid and Duodenum. Had a discussion with him and his wife about DNR/DNI but he wants everything done. Pt is being admitted to telemetry for further medical management.

## 2017-07-15 NOTE — ED ADULT NURSE NOTE - CHPI ED SYMPTOMS NEG
no dizziness/no numbness/no nausea/no tingling/no chills/no decreased eating/drinking/no vomiting/no weakness/no fever/no pain

## 2017-07-15 NOTE — H&P ADULT - RS GEN PE MLT RESP DETAILS PC
good air movement/airway patent/respirations labored/breath sounds equal/no chest wall tenderness/clear to auscultation bilaterally

## 2017-07-15 NOTE — H&P ADULT - PMH
BPH (benign prostatic hypertrophy)    Diabetes mellitus    DVT (deep venous thrombosis)    GERD (gastroesophageal reflux disease)    Glaucoma    Hyperlipidemia    Hypertension    Pancreatic cancer metastasized to liver    Pulmonary embolism

## 2017-07-15 NOTE — H&P ADULT - NSHPSOCIALHISTORY_GEN_ALL_CORE
Marital Status:     Occupation: Retired - Patient Care Technician @ [employer]    Tobacco Use: never used    ETOH Use: never    Flu Vaccine:     neg                             Pneumonia Vaccine:   neg

## 2017-07-15 NOTE — ED ADULT NURSE NOTE - CHIEF COMPLAINT QUOTE
Pt arrives from home accompanied by his son. Pt states he was diagnosed with a blood clot in his left leg approx 1 month ago and was admitted to [facility] for a 5 day inpatient stay for same. Pt has been taking injectable Lovenox daily since discharge; states this past Wednesday left leg began swelling as well as left foot. Pt has tried elevating said extremity but swelling persists and has increased. Pts states he called pts PMD today and was told to go to ER to r/o blood clot in left leg. Pt denies pain; pts leg warm and dry with +pulse. Pts son also states pt was diagnosed with stage IV pancreatic cancer last month.

## 2017-07-16 LAB
APTT BLD: 53.8 SEC — HIGH (ref 27.5–37.4)
APTT BLD: 69.4 SEC — HIGH (ref 27.5–37.4)
APTT BLD: 70.9 SEC — HIGH (ref 27.5–37.4)
BUN SERPL-MCNC: 13 MG/DL — SIGNIFICANT CHANGE UP (ref 7–23)
CALCIUM SERPL-MCNC: 8.7 MG/DL — SIGNIFICANT CHANGE UP (ref 8.4–10.5)
CHLORIDE SERPL-SCNC: 94 MMOL/L — LOW (ref 98–107)
CO2 SERPL-SCNC: 32 MMOL/L — HIGH (ref 22–31)
CREAT SERPL-MCNC: 0.8 MG/DL — SIGNIFICANT CHANGE UP (ref 0.5–1.3)
GLUCOSE SERPL-MCNC: 55 MG/DL — LOW (ref 70–99)
HCT VFR BLD CALC: 27.7 % — LOW (ref 39–50)
HGB BLD-MCNC: 8.8 G/DL — LOW (ref 13–17)
INR BLD: 1.29 — HIGH (ref 0.88–1.17)
MCHC RBC-ENTMCNC: 29.6 PG — SIGNIFICANT CHANGE UP (ref 27–34)
MCHC RBC-ENTMCNC: 31.8 % — LOW (ref 32–36)
MCV RBC AUTO: 93.3 FL — SIGNIFICANT CHANGE UP (ref 80–100)
NRBC # FLD: 0 — SIGNIFICANT CHANGE UP
PLATELET # BLD AUTO: 260 K/UL — SIGNIFICANT CHANGE UP (ref 150–400)
PMV BLD: 9.6 FL — SIGNIFICANT CHANGE UP (ref 7–13)
POTASSIUM SERPL-MCNC: 3.4 MMOL/L — LOW (ref 3.5–5.3)
POTASSIUM SERPL-SCNC: 3.4 MMOL/L — LOW (ref 3.5–5.3)
PROTHROM AB SERPL-ACNC: 14.5 SEC — HIGH (ref 9.8–13.1)
RBC # BLD: 2.97 M/UL — LOW (ref 4.2–5.8)
RBC # FLD: 13.1 % — SIGNIFICANT CHANGE UP (ref 10.3–14.5)
SODIUM SERPL-SCNC: 137 MMOL/L — SIGNIFICANT CHANGE UP (ref 135–145)
WBC # BLD: 8.95 K/UL — SIGNIFICANT CHANGE UP (ref 3.8–10.5)
WBC # FLD AUTO: 8.95 K/UL — SIGNIFICANT CHANGE UP (ref 3.8–10.5)

## 2017-07-16 RX ORDER — POTASSIUM CHLORIDE 20 MEQ
20 PACKET (EA) ORAL ONCE
Qty: 0 | Refills: 0 | Status: COMPLETED | OUTPATIENT
Start: 2017-07-16 | End: 2017-07-16

## 2017-07-16 RX ORDER — HEPARIN SODIUM 5000 [USP'U]/ML
3500 INJECTION INTRAVENOUS; SUBCUTANEOUS EVERY 6 HOURS
Qty: 0 | Refills: 0 | Status: DISCONTINUED | OUTPATIENT
Start: 2017-07-16 | End: 2017-07-19

## 2017-07-16 RX ORDER — TIMOLOL 0.5 %
1 DROPS OPHTHALMIC (EYE)
Qty: 0 | Refills: 0 | Status: DISCONTINUED | OUTPATIENT
Start: 2017-07-16 | End: 2017-07-19

## 2017-07-16 RX ORDER — ACETAZOLAMIDE 250 MG/1
500 TABLET ORAL DAILY
Qty: 0 | Refills: 0 | Status: DISCONTINUED | OUTPATIENT
Start: 2017-07-16 | End: 2017-07-19

## 2017-07-16 RX ORDER — BRIMONIDINE TARTRATE 2 MG/MG
1 SOLUTION/ DROPS OPHTHALMIC
Qty: 0 | Refills: 0 | Status: DISCONTINUED | OUTPATIENT
Start: 2017-07-16 | End: 2017-07-19

## 2017-07-16 RX ORDER — PILOCARPINE HCL 4 %
2 DROPS OPHTHALMIC (EYE)
Qty: 0 | Refills: 0 | Status: DISCONTINUED | OUTPATIENT
Start: 2017-07-16 | End: 2017-07-19

## 2017-07-16 RX ORDER — HEPARIN SODIUM 5000 [USP'U]/ML
7500 INJECTION INTRAVENOUS; SUBCUTANEOUS EVERY 6 HOURS
Qty: 0 | Refills: 0 | Status: DISCONTINUED | OUTPATIENT
Start: 2017-07-16 | End: 2017-07-19

## 2017-07-16 RX ORDER — HEPARIN SODIUM 5000 [USP'U]/ML
2100 INJECTION INTRAVENOUS; SUBCUTANEOUS
Qty: 25000 | Refills: 0 | Status: DISCONTINUED | OUTPATIENT
Start: 2017-07-16 | End: 2017-07-19

## 2017-07-16 RX ADMIN — Medication 12.5 MILLIGRAM(S): at 05:49

## 2017-07-16 RX ADMIN — BRIMONIDINE TARTRATE 1 DROP(S): 2 SOLUTION/ DROPS OPHTHALMIC at 05:48

## 2017-07-16 RX ADMIN — Medication 2 DROP(S): at 13:09

## 2017-07-16 RX ADMIN — HEPARIN SODIUM 1900 UNIT(S)/HR: 5000 INJECTION INTRAVENOUS; SUBCUTANEOUS at 01:09

## 2017-07-16 RX ADMIN — Medication 1 DROP(S): at 18:46

## 2017-07-16 RX ADMIN — Medication 20 MILLIEQUIVALENT(S): at 13:08

## 2017-07-16 RX ADMIN — MORPHINE SULFATE 30 MILLIGRAM(S): 50 CAPSULE, EXTENDED RELEASE ORAL at 06:40

## 2017-07-16 RX ADMIN — SENNA PLUS 2 TABLET(S): 8.6 TABLET ORAL at 21:35

## 2017-07-16 RX ADMIN — ACETAZOLAMIDE 500 MILLIGRAM(S): 250 TABLET ORAL at 19:26

## 2017-07-16 RX ADMIN — ACETAZOLAMIDE 500 MILLIGRAM(S): 250 TABLET ORAL at 22:23

## 2017-07-16 RX ADMIN — Medication 100 MILLIGRAM(S): at 05:49

## 2017-07-16 RX ADMIN — Medication 1 DROP(S): at 05:48

## 2017-07-16 RX ADMIN — FAMOTIDINE 20 MILLIGRAM(S): 10 INJECTION INTRAVENOUS at 13:08

## 2017-07-16 RX ADMIN — Medication 2 DROP(S): at 00:05

## 2017-07-16 RX ADMIN — HEPARIN SODIUM 2100 UNIT(S)/HR: 5000 INJECTION INTRAVENOUS; SUBCUTANEOUS at 08:19

## 2017-07-16 RX ADMIN — Medication 2 DROP(S): at 18:46

## 2017-07-16 RX ADMIN — Medication 2 MILLIGRAM(S): at 21:35

## 2017-07-16 RX ADMIN — PANTOPRAZOLE SODIUM 40 MILLIGRAM(S): 20 TABLET, DELAYED RELEASE ORAL at 05:51

## 2017-07-16 RX ADMIN — Medication 2 DROP(S): at 05:48

## 2017-07-16 RX ADMIN — BRIMONIDINE TARTRATE 1 DROP(S): 2 SOLUTION/ DROPS OPHTHALMIC at 18:46

## 2017-07-16 RX ADMIN — Medication 100 MILLIGRAM(S): at 21:35

## 2017-07-16 RX ADMIN — MORPHINE SULFATE 30 MILLIGRAM(S): 50 CAPSULE, EXTENDED RELEASE ORAL at 05:49

## 2017-07-16 RX ADMIN — Medication 6 UNIT(S): at 09:14

## 2017-07-16 RX ADMIN — LISINOPRIL 10 MILLIGRAM(S): 2.5 TABLET ORAL at 05:49

## 2017-07-16 RX ADMIN — Medication 2 DROP(S): at 23:37

## 2017-07-16 RX ADMIN — AMLODIPINE BESYLATE 5 MILLIGRAM(S): 2.5 TABLET ORAL at 05:49

## 2017-07-16 RX ADMIN — Medication 100 MILLIGRAM(S): at 14:14

## 2017-07-16 RX ADMIN — HEPARIN SODIUM 2100 UNIT(S)/HR: 5000 INJECTION INTRAVENOUS; SUBCUTANEOUS at 16:56

## 2017-07-16 NOTE — PROGRESS NOTE ADULT - SUBJECTIVE AND OBJECTIVE BOX
Patient is a 81y old  Male who presents with a chief complaint of LE Swelling/SOB (15 Jul 2017 08:44)  Patient seen and examined.    INTERVAL HPI/OVERNIGHT EVENTS:  T(C): 36.8 (17 @ 21:07), Max: 37.1 (17 @ 10:21)  HR: 103 (17 @ 21:07) (91 - 108)  BP: 108/58 (17 @ 21:07) (91/62 - 122/78)  RR: 18 (17 @ 21:07) (16 - 28)  SpO2: 97% (17 @ 21:07) (97% - 100%)  Wt(kg): --  I&O's Summary    2017 07:01  -  2017 21:38  --------------------------------------------------------  IN: 0 mL / OUT: 200 mL / NET: -200 mL        PAST MEDICAL & SURGICAL HISTORY:  BPH (benign prostatic hypertrophy)  GERD (gastroesophageal reflux disease)  DVT (deep venous thrombosis)  Pulmonary embolism  Glaucoma  Pancreatic cancer metastasized to liver  Hyperlipidemia  Hypertension  Diabetes mellitus  No significant past surgical history      REVIEW OF SYSTEMS:  Not much obtainable.    RADIOLOGY & ADDITIONAL TESTS:    Imaging Personally Reviewed:  [ ] YES  [ ] NO    Consultant(s) Notes Reviewed:  [ ] YES  [ ] NO    PHYSICAL EXAM:  GENERAL: NAD, well-groomed, well-developed  HEAD:  Atraumatic, Normocephalic  EYES: EOMI, PERRLA, conjunctiva and sclera clear  ENMT: No tonsillar erythema, exudates, or enlargement; Moist mucous membranes, Good dentition, No lesions  NECK: Supple, No JVD, Normal thyroid  NERVOUS SYSTEM:  Lethargic  CHEST/LUNG: Clear to percussion bilaterally; No rales, rhonchi, wheezing, or rubs  HEART: Regular rate and rhythm; No murmurs, rubs, or gallops  ABDOMEN: Soft, Nontender, Nondistended; Bowel sounds present  EXTREMITIES:  2+ Peripheral Pulses, No clubbing, cyanosis, + edema  LYMPH: No lymphadenopathy noted  SKIN: No rashes or lesions    LABS:                        8.8    8.95  )-----------( 260      ( 2017 06:59 )             27.7     07-16    137  |  94<L>  |  13  ----------------------------<  55<L>  3.4<L>   |  32<H>  |  0.80    Ca    8.7      2017 06:59  Phos  4.0     07-15  Mg     2.4     07-15    TPro  5.6<L>  /  Alb  2.5<L>  /  TBili  1.1  /  DBili  x   /  AST  28  /  ALT  25  /  AlkPhos  179<H>  07-15    PT/INR - ( 2017 06:59 )   PT: 14.5 SEC;   INR: 1.29          PTT - ( 2017 14:20 )  PTT:70.9 SEC  Urinalysis Basic - ( 15 Jul 2017 12:10 )    Color: YELLOW / Appearance: CLEAR / S.025 / pH: 5.5  Gluc: NEGATIVE / Ketone: NEGATIVE  / Bili: NEGATIVE / Urobili: NORMAL E.U.   Blood: x / Protein: 10 / Nitrite: NEGATIVE   Leuk Esterase: NEGATIVE / RBC: 5-10 / WBC 0-2   Sq Epi: x / Non Sq Epi: x / Bacteria: x      CAPILLARY BLOOD GLUCOSE  146 (2017 16:38)  71 (2017 12:54)  83 (2017 07:50)  74 (15 Jul 2017 21:57)            Urinalysis Basic - ( 15 Jul 2017 12:10 )    Color: YELLOW / Appearance: CLEAR / S.025 / pH: 5.5  Gluc: NEGATIVE / Ketone: NEGATIVE  / Bili: NEGATIVE / Urobili: NORMAL E.U.   Blood: x / Protein: 10 / Nitrite: NEGATIVE   Leuk Esterase: NEGATIVE / RBC: 5-10 / WBC 0-2   Sq Epi: x / Non Sq Epi: x / Bacteria: x        MEDICATIONS  (STANDING):  doxazosin 2 milliGRAM(s) Oral at bedtime  amLODIPine   Tablet 5 milliGRAM(s) Oral daily  lisinopril 10 milliGRAM(s) Oral daily  hydrochlorothiazide 12.5 milliGRAM(s) Oral daily  famotidine    Tablet 20 milliGRAM(s) Oral daily  pantoprazole    Tablet 40 milliGRAM(s) Oral before breakfast  insulin lispro (HumaLOG) corrective regimen sliding scale   SubCutaneous three times a day before meals  insulin lispro (HumaLOG) corrective regimen sliding scale   SubCutaneous at bedtime  dextrose 5%. 1000 milliLiter(s) (50 mL/Hr) IV Continuous <Continuous>  dextrose 50% Injectable 12.5 Gram(s) IV Push once  dextrose 50% Injectable 25 Gram(s) IV Push once  dextrose 50% Injectable 25 Gram(s) IV Push once  docusate sodium 100 milliGRAM(s) Oral three times a day  senna 2 Tablet(s) Oral at bedtime  brimonidine 0.2% Ophthalmic Solution 1 Drop(s) Right EYE two times a day  timolol 0.5% Solution 1 Drop(s) Right EYE two times a day  pilocarpine 4% Solution 2 Drop(s) Right EYE four times a day  heparin  Infusion. 2100 Unit(s)/Hr (21 mL/Hr) IV Continuous <Continuous>  acetazolamide   ER Capsule 500 milliGRAM(s) Oral daily    MEDICATIONS  (PRN):  oxyCODONE    5 mG/acetaminophen 325 mG 1 Tablet(s) Oral every 8 hours PRN Severe Pain (7 - 10)  metoclopramide 10 milliGRAM(s) Oral three times a day PRN nausea  acetaminophen   Tablet. 650 milliGRAM(s) Oral every 6 hours PRN mild, moderate pain  dextrose Gel 1 Dose(s) Oral once PRN Blood Glucose LESS THAN 70 milliGRAM(s)/deciliter  glucagon  Injectable 1 milliGRAM(s) IntraMuscular once PRN Glucose LESS THAN 70 milligrams/deciliter  heparin  Injectable 7500 Unit(s) IV Push every 6 hours PRN For aPTT less than 40  heparin  Injectable 3500 Unit(s) IV Push every 6 hours PRN For aPTT between 40 - 57      Care Discussed with Consultants/Other Providers [ ] YES  [ ] NO

## 2017-07-16 NOTE — PROGRESS NOTE ADULT - SUBJECTIVE AND OBJECTIVE BOX
Chief complaint  Patient is a 81y old  Male who presents with a chief complaint of LE Swelling/SOB (15 Jul 2017 08:44)   Review of systems  Patient in bed, comfortable, no fever, no hypoglycemia.    Labs and Fingesticks    CAPILLARY BLOOD GLUCOSE  71 (16 Jul 2017 12:54)  83 (16 Jul 2017 07:50)  74 (15 Jul 2017 21:57)  106 (15 Jul 2017 16:40)  189 (15 Jul 2017 12:22)  184 (15 Jul 2017 08:33)      Hemoglobin A1C, Whole Blood: 8.1 <H> (07-15 @ 08:00)    Calcium, Total Serum: 8.7 (07-16 @ 06:59)  Calcium, Total Serum: 8.5 (07-15 @ 08:00)  Calcium, Total Serum: 8.8 (07-14 @ 23:50)  Albumin, Serum: 2.5 <L> (07-15 @ 08:00)  Albumin, Serum: 2.7 <L> (07-14 @ 23:50)    Alanine Aminotransferase (ALT/SGPT): 25 (07-15 @ 08:00)  Alanine Aminotransferase (ALT/SGPT): 24 (07-14 @ 23:50)  Alkaline Phosphatase, Serum: 179 <H> (07-15 @ 08:00)  Alkaline Phosphatase, Serum: 184 <H> (07-14 @ 23:50)  Aspartate Aminotransferase (AST/SGOT): 28 (07-15 @ 08:00)  Aspartate Aminotransferase (AST/SGOT): 28 (07-14 @ 23:50)        07-16    137  |  94<L>  |  13  ----------------------------<  55<L>  3.4<L>   |  32<H>  |  0.80    Ca    8.7      16 Jul 2017 06:59  Phos  4.0     07-15  Mg     2.4     07-15    TPro  5.6<L>  /  Alb  2.5<L>  /  TBili  1.1  /  DBili  x   /  AST  28  /  ALT  25  /  AlkPhos  179<H>  07-15                        8.8    8.95  )-----------( 260      ( 16 Jul 2017 06:59 )             27.7     Medications  MEDICATIONS  (STANDING):  heparin  Infusion.  Unit(s)/Hr (15 mL/Hr) IV Continuous <Continuous>  morphine ER Tablet 30 milliGRAM(s) Oral every 12 hours  doxazosin 2 milliGRAM(s) Oral at bedtime  amLODIPine   Tablet 5 milliGRAM(s) Oral daily  lisinopril 10 milliGRAM(s) Oral daily  acetazolamide   ER Capsule 500 milliGRAM(s) Oral daily  hydrochlorothiazide 12.5 milliGRAM(s) Oral daily  famotidine    Tablet 20 milliGRAM(s) Oral daily  pantoprazole    Tablet 40 milliGRAM(s) Oral before breakfast  insulin lispro (HumaLOG) corrective regimen sliding scale   SubCutaneous three times a day before meals  insulin lispro (HumaLOG) corrective regimen sliding scale   SubCutaneous at bedtime  dextrose 5%. 1000 milliLiter(s) (50 mL/Hr) IV Continuous <Continuous>  dextrose 50% Injectable 12.5 Gram(s) IV Push once  dextrose 50% Injectable 25 Gram(s) IV Push once  dextrose 50% Injectable 25 Gram(s) IV Push once  docusate sodium 100 milliGRAM(s) Oral three times a day  senna 2 Tablet(s) Oral at bedtime  brimonidine 0.2% Ophthalmic Solution 1 Drop(s) Right EYE two times a day  timolol 0.5% Solution 1 Drop(s) Right EYE two times a day  pilocarpine 4% Solution 2 Drop(s) Right EYE four times a day      Physical Exam  General: Patient comfortable in bed  Vital Signs Last 12 Hrs  T(F): 98.3 (07-16-17 @ 14:13), Max: 98.8 (07-16-17 @ 10:21)  HR: 105 (07-16-17 @ 14:13) (100 - 108)  BP: 104/59 (07-16-17 @ 14:13) (91/62 - 120/63)  BP(mean): --  RR: 16 (07-16-17 @ 14:13) (16 - 20)  SpO2: 98% (07-16-17 @ 14:13) (97% - 100%)  Neck: No palpable thyroid nodules.  CVS: S1S2, No murmurs  Respiratory: No wheezing, no crepitations  GI: Abdomen soft, bowel sounds positive  Musculoskeletal: Positive edema lower extremities bilaterally  Skin: No skin rashes, no ecchimosis    Diagnostics

## 2017-07-16 NOTE — PROVIDER CONTACT NOTE (OTHER) - SITUATION
Patient has a low BP 91/62 . Patient is asymptomatic, denies any chest pain, no N/V, no other signs of distress noted.

## 2017-07-16 NOTE — PROGRESS NOTE ADULT - ASSESSMENT
Assessment  DMT2: 81y Male with DM T2 with hyperglycemia on large dose basal insulin twice daily, poor po intake, sugars trending down, poor po intake.  HTN: uncontrolled on meds.  HLD:  on statin, tolerating.  Pulmonary embolism: on treatment.

## 2017-07-17 LAB
APTT BLD: 38.8 SEC — HIGH (ref 27.5–37.4)
APTT BLD: 53.4 SEC — HIGH (ref 27.5–37.4)
APTT BLD: 63.8 SEC — HIGH (ref 27.5–37.4)
APTT BLD: 91.5 SEC — HIGH (ref 27.5–37.4)
BUN SERPL-MCNC: 18 MG/DL — SIGNIFICANT CHANGE UP (ref 7–23)
CALCIUM SERPL-MCNC: 8.1 MG/DL — LOW (ref 8.4–10.5)
CHLORIDE SERPL-SCNC: 89 MMOL/L — LOW (ref 98–107)
CO2 SERPL-SCNC: 30 MMOL/L — SIGNIFICANT CHANGE UP (ref 22–31)
CREAT SERPL-MCNC: 1.01 MG/DL — SIGNIFICANT CHANGE UP (ref 0.5–1.3)
GLUCOSE SERPL-MCNC: 291 MG/DL — HIGH (ref 70–99)
HCT VFR BLD CALC: 26.2 % — LOW (ref 39–50)
HGB BLD-MCNC: 8.5 G/DL — LOW (ref 13–17)
INR BLD: 1.23 — HIGH (ref 0.88–1.17)
MAGNESIUM SERPL-MCNC: 2.5 MG/DL — SIGNIFICANT CHANGE UP (ref 1.6–2.6)
MCHC RBC-ENTMCNC: 30.4 PG — SIGNIFICANT CHANGE UP (ref 27–34)
MCHC RBC-ENTMCNC: 32.4 % — SIGNIFICANT CHANGE UP (ref 32–36)
MCV RBC AUTO: 93.6 FL — SIGNIFICANT CHANGE UP (ref 80–100)
NRBC # FLD: 0 — SIGNIFICANT CHANGE UP
PHOSPHATE SERPL-MCNC: 4.9 MG/DL — HIGH (ref 2.5–4.5)
PLATELET # BLD AUTO: 257 K/UL — SIGNIFICANT CHANGE UP (ref 150–400)
PMV BLD: 10 FL — SIGNIFICANT CHANGE UP (ref 7–13)
POTASSIUM SERPL-MCNC: 3.9 MMOL/L — SIGNIFICANT CHANGE UP (ref 3.5–5.3)
POTASSIUM SERPL-SCNC: 3.9 MMOL/L — SIGNIFICANT CHANGE UP (ref 3.5–5.3)
PROTHROM AB SERPL-ACNC: 13.8 SEC — HIGH (ref 9.8–13.1)
RBC # BLD: 2.8 M/UL — LOW (ref 4.2–5.8)
RBC # FLD: 13.2 % — SIGNIFICANT CHANGE UP (ref 10.3–14.5)
SODIUM SERPL-SCNC: 130 MMOL/L — LOW (ref 135–145)
WBC # BLD: 8.98 K/UL — SIGNIFICANT CHANGE UP (ref 3.8–10.5)
WBC # FLD AUTO: 8.98 K/UL — SIGNIFICANT CHANGE UP (ref 3.8–10.5)

## 2017-07-17 RX ORDER — INSULIN GLARGINE 100 [IU]/ML
8 INJECTION, SOLUTION SUBCUTANEOUS AT BEDTIME
Qty: 0 | Refills: 0 | Status: DISCONTINUED | OUTPATIENT
Start: 2017-07-17 | End: 2017-07-18

## 2017-07-17 RX ORDER — INSULIN LISPRO 100/ML
6 VIAL (ML) SUBCUTANEOUS
Qty: 0 | Refills: 0 | Status: DISCONTINUED | OUTPATIENT
Start: 2017-07-17 | End: 2017-07-18

## 2017-07-17 RX ADMIN — HEPARIN SODIUM 2700 UNIT(S)/HR: 5000 INJECTION INTRAVENOUS; SUBCUTANEOUS at 15:47

## 2017-07-17 RX ADMIN — HEPARIN SODIUM 2700 UNIT(S)/HR: 5000 INJECTION INTRAVENOUS; SUBCUTANEOUS at 23:25

## 2017-07-17 RX ADMIN — Medication 12.5 MILLIGRAM(S): at 06:06

## 2017-07-17 RX ADMIN — Medication 6: at 12:57

## 2017-07-17 RX ADMIN — HEPARIN SODIUM 3500 UNIT(S): 5000 INJECTION INTRAVENOUS; SUBCUTANEOUS at 15:51

## 2017-07-17 RX ADMIN — OXYCODONE AND ACETAMINOPHEN 1 TABLET(S): 5; 325 TABLET ORAL at 17:05

## 2017-07-17 RX ADMIN — Medication 2 DROP(S): at 06:06

## 2017-07-17 RX ADMIN — HEPARIN SODIUM 2100 UNIT(S)/HR: 5000 INJECTION INTRAVENOUS; SUBCUTANEOUS at 00:53

## 2017-07-17 RX ADMIN — Medication 6 UNIT(S): at 18:27

## 2017-07-17 RX ADMIN — Medication 2 DROP(S): at 12:57

## 2017-07-17 RX ADMIN — Medication 100 MILLIGRAM(S): at 06:06

## 2017-07-17 RX ADMIN — LISINOPRIL 10 MILLIGRAM(S): 2.5 TABLET ORAL at 06:06

## 2017-07-17 RX ADMIN — AMLODIPINE BESYLATE 5 MILLIGRAM(S): 2.5 TABLET ORAL at 06:06

## 2017-07-17 RX ADMIN — ACETAZOLAMIDE 500 MILLIGRAM(S): 250 TABLET ORAL at 12:57

## 2017-07-17 RX ADMIN — SENNA PLUS 2 TABLET(S): 8.6 TABLET ORAL at 22:35

## 2017-07-17 RX ADMIN — INSULIN GLARGINE 8 UNIT(S): 100 INJECTION, SOLUTION SUBCUTANEOUS at 22:35

## 2017-07-17 RX ADMIN — PANTOPRAZOLE SODIUM 40 MILLIGRAM(S): 20 TABLET, DELAYED RELEASE ORAL at 06:06

## 2017-07-17 RX ADMIN — BRIMONIDINE TARTRATE 1 DROP(S): 2 SOLUTION/ DROPS OPHTHALMIC at 18:26

## 2017-07-17 RX ADMIN — Medication 100 MILLIGRAM(S): at 12:59

## 2017-07-17 RX ADMIN — BRIMONIDINE TARTRATE 1 DROP(S): 2 SOLUTION/ DROPS OPHTHALMIC at 06:05

## 2017-07-17 RX ADMIN — HEPARIN SODIUM 2500 UNIT(S)/HR: 5000 INJECTION INTRAVENOUS; SUBCUTANEOUS at 08:23

## 2017-07-17 RX ADMIN — Medication 6: at 18:27

## 2017-07-17 RX ADMIN — Medication 8: at 09:48

## 2017-07-17 RX ADMIN — OXYCODONE AND ACETAMINOPHEN 1 TABLET(S): 5; 325 TABLET ORAL at 17:33

## 2017-07-17 RX ADMIN — Medication 1 DROP(S): at 18:27

## 2017-07-17 RX ADMIN — Medication 2 MILLIGRAM(S): at 22:35

## 2017-07-17 RX ADMIN — Medication 100 MILLIGRAM(S): at 22:35

## 2017-07-17 RX ADMIN — Medication 2 DROP(S): at 18:27

## 2017-07-17 RX ADMIN — Medication 1 DROP(S): at 06:05

## 2017-07-17 RX ADMIN — FAMOTIDINE 20 MILLIGRAM(S): 10 INJECTION INTRAVENOUS at 12:57

## 2017-07-17 NOTE — STUDENT SIGN OFF DOCUMENT - COPY OF STUDENT DOCUMENT REVIEW
Pt presents with fevers since yesterday. Today pt woke up from sleep and seemed warm, mother took a rectal temp and it was 106. Tylenol given at 2300. Temp 102.6 in triage, tachycardic with fever. Pt has runny nose, no increased WOB noted. Immunizations up to date but per mom did not get the Rotovirus immunization.  
Physical Therapy

## 2017-07-17 NOTE — PROGRESS NOTE ADULT - SUBJECTIVE AND OBJECTIVE BOX
Patient is a 81y old  Male who presents with a chief complaint of LE Swelling/SOB (15 Jul 2017 08:44)  Patient seen and examined. More alert today/    Vital Signs Last 24 Hrs  T(C): 36.7 (17 Jul 2017 22:25), Max: 37.2 (17 Jul 2017 10:32)  T(F): 98.1 (17 Jul 2017 22:25), Max: 98.9 (17 Jul 2017 10:32)  HR: 109 (17 Jul 2017 22:25) (73 - 110)  BP: 109/49 (17 Jul 2017 22:25) (108/53 - 119/60)  BP(mean): --  RR: 20 (17 Jul 2017 22:25) (18 - 24)  SpO2: 99% (17 Jul 2017 22:25) (98% - 100%)        PAST MEDICAL & SURGICAL HISTORY:  BPH (benign prostatic hypertrophy)  GERD (gastroesophageal reflux disease)  DVT (deep venous thrombosis)  Pulmonary embolism  Glaucoma  Pancreatic cancer metastasized to liver  Hyperlipidemia  Hypertension  Diabetes mellitus  No significant past surgical history      REVIEW OF SYSTEMS:  Not much obtainable.    RADIOLOGY & ADDITIONAL TESTS:    Imaging Personally Reviewed:  [ ] YES  [ ] NO    Consultant(s) Notes Reviewed:  [ ] YES  [ ] NO    PHYSICAL EXAM:  GENERAL: NAD, well-groomed, well-developed  HEAD:  Atraumatic, Normocephalic  EYES: EOMI, PERRLA, conjunctiva and sclera clear  ENMT: No tonsillar erythema, exudates, or enlargement; Moist mucous membranes, Good dentition, No lesions  NECK: Supple, No JVD, Normal thyroid  NERVOUS SYSTEM:  Lethargic  CHEST/LUNG: Clear to percussion bilaterally; No rales, rhonchi, wheezing, or rubs  HEART: Regular rate and rhythm; No murmurs, rubs, or gallops  ABDOMEN: Soft, Nontender, Nondistended; Bowel sounds present  EXTREMITIES:  2+ Peripheral Pulses, No clubbing, cyanosis, + edema  LYMPH: No lymphadenopathy noted  SKIN: No rashes or lesions    LABS:                                   8.5    8.98  )-----------( 257      ( 17 Jul 2017 06:58 )             26.2     07-17    130<L>  |  89<L>  |  18  ----------------------------<  291<H>  3.9   |  30  |  1.01    Ca    8.1<L>      17 Jul 2017 06:58  Phos  4.9     07-17  Mg     2.5     07-17        MEDICATIONS  (STANDING):  doxazosin 2 milliGRAM(s) Oral at bedtime  amLODIPine   Tablet 5 milliGRAM(s) Oral daily  lisinopril 10 milliGRAM(s) Oral daily  hydrochlorothiazide 12.5 milliGRAM(s) Oral daily  famotidine    Tablet 20 milliGRAM(s) Oral daily  pantoprazole    Tablet 40 milliGRAM(s) Oral before breakfast  insulin lispro (HumaLOG) corrective regimen sliding scale   SubCutaneous three times a day before meals  insulin lispro (HumaLOG) corrective regimen sliding scale   SubCutaneous at bedtime  dextrose 5%. 1000 milliLiter(s) (50 mL/Hr) IV Continuous <Continuous>  dextrose 50% Injectable 12.5 Gram(s) IV Push once  dextrose 50% Injectable 25 Gram(s) IV Push once  dextrose 50% Injectable 25 Gram(s) IV Push once  docusate sodium 100 milliGRAM(s) Oral three times a day  senna 2 Tablet(s) Oral at bedtime  brimonidine 0.2% Ophthalmic Solution 1 Drop(s) Right EYE two times a day  timolol 0.5% Solution 1 Drop(s) Right EYE two times a day  pilocarpine 4% Solution 2 Drop(s) Right EYE four times a day  heparin  Infusion. 2100 Unit(s)/Hr (21 mL/Hr) IV Continuous <Continuous>  acetazolamide   ER Capsule 500 milliGRAM(s) Oral daily    MEDICATIONS  (PRN):  oxyCODONE    5 mG/acetaminophen 325 mG 1 Tablet(s) Oral every 8 hours PRN Severe Pain (7 - 10)  metoclopramide 10 milliGRAM(s) Oral three times a day PRN nausea  acetaminophen   Tablet. 650 milliGRAM(s) Oral every 6 hours PRN mild, moderate pain  dextrose Gel 1 Dose(s) Oral once PRN Blood Glucose LESS THAN 70 milliGRAM(s)/deciliter  glucagon  Injectable 1 milliGRAM(s) IntraMuscular once PRN Glucose LESS THAN 70 milligrams/deciliter  heparin  Injectable 7500 Unit(s) IV Push every 6 hours PRN For aPTT less than 40  heparin  Injectable 3500 Unit(s) IV Push every 6 hours PRN For aPTT between 40 - 57      Care Discussed with Consultants/Other Providers [ ] YES  [ ] NO

## 2017-07-17 NOTE — PROGRESS NOTE ADULT - SUBJECTIVE AND OBJECTIVE BOX
Chief complaint  Patient is a 81y old  Male who presents with a chief complaint of LE Swelling/SOB (15 Jul 2017 08:44)   Review of systems  Patient in bed, comfortable, no fever, eating full meals,  no new hypoglycemia.    Labs and Fingesticks    CAPILLARY BLOOD GLUCOSE  262 (17 Jul 2017 11:42)  318 (17 Jul 2017 08:43)  221 (16 Jul 2017 22:28)  146 (16 Jul 2017 16:38)  71 (16 Jul 2017 12:54)  83 (16 Jul 2017 07:50)  74 (15 Jul 2017 21:57)  106 (15 Jul 2017 16:40)  189 (15 Jul 2017 12:22)  184 (15 Jul 2017 08:33)        Calcium, Total Serum: 8.1 <L> (07-17 @ 06:58)  Calcium, Total Serum: 8.7 (07-16 @ 06:59)          07-17    130<L>  |  89<L>  |  18  ----------------------------<  291<H>  3.9   |  30  |  1.01    Ca    8.1<L>      17 Jul 2017 06:58  Phos  4.9     07-17  Mg     2.5     07-17                          8.5    8.98  )-----------( 257      ( 17 Jul 2017 06:58 )             26.2     Medications  MEDICATIONS  (STANDING):  doxazosin 2 milliGRAM(s) Oral at bedtime  amLODIPine   Tablet 5 milliGRAM(s) Oral daily  lisinopril 10 milliGRAM(s) Oral daily  hydrochlorothiazide 12.5 milliGRAM(s) Oral daily  famotidine    Tablet 20 milliGRAM(s) Oral daily  pantoprazole    Tablet 40 milliGRAM(s) Oral before breakfast  insulin lispro (HumaLOG) corrective regimen sliding scale   SubCutaneous three times a day before meals  insulin lispro (HumaLOG) corrective regimen sliding scale   SubCutaneous at bedtime  dextrose 5%. 1000 milliLiter(s) (50 mL/Hr) IV Continuous <Continuous>  dextrose 50% Injectable 12.5 Gram(s) IV Push once  dextrose 50% Injectable 25 Gram(s) IV Push once  dextrose 50% Injectable 25 Gram(s) IV Push once  docusate sodium 100 milliGRAM(s) Oral three times a day  senna 2 Tablet(s) Oral at bedtime  brimonidine 0.2% Ophthalmic Solution 1 Drop(s) Right EYE two times a day  timolol 0.5% Solution 1 Drop(s) Right EYE two times a day  pilocarpine 4% Solution 2 Drop(s) Right EYE four times a day  heparin  Infusion. 2100 Unit(s)/Hr (21 mL/Hr) IV Continuous <Continuous>  acetazolamide   ER Capsule 500 milliGRAM(s) Oral daily  insulin glargine Injectable (LANTUS) 8 Unit(s) SubCutaneous at bedtime  insulin lispro Injectable (HumaLOG) 6 Unit(s) SubCutaneous three times a day before meals      Physical Exam  General: Patient comfortable in bed  Vital Signs Last 12 Hrs  T(F): 98.4 (07-17-17 @ 14:00), Max: 98.9 (07-17-17 @ 10:32)  HR: 110 (07-17-17 @ 14:00) (73 - 110)  BP: 108/53 (07-17-17 @ 14:00) (108/53 - 117/53)  BP(mean): --  RR: 24 (07-17-17 @ 14:00) (18 - 24)  SpO2: 99% (07-17-17 @ 14:00) (98% - 99%)  Neck: No palpable thyroid nodules.  CVS: S1S2, No murmurs  Respiratory: No wheezing, no crepitations  GI: Abdomen soft, bowel sounds positive  Musculoskeletal: Positive edema lower extremities bilaterally  Skin: No skin rashes, no ecchimosis    Diagnostics

## 2017-07-17 NOTE — PROGRESS NOTE ADULT - ASSESSMENT
Assessment  DMT2: 81y Male with DM T2 with hyperglycemia was on large dose basal insulin twice daily, had hypo, eating meals now, sugars trending up.  HTN: uncontrolled on meds.  HLD:  on statin, tolerating.  Pulmonary embolism: on treatment.

## 2017-07-18 LAB
APTT BLD: 94.6 SEC — HIGH (ref 27.5–37.4)
BUN SERPL-MCNC: 16 MG/DL — SIGNIFICANT CHANGE UP (ref 7–23)
CALCIUM SERPL-MCNC: 8.1 MG/DL — LOW (ref 8.4–10.5)
CHLORIDE SERPL-SCNC: 88 MMOL/L — LOW (ref 98–107)
CO2 SERPL-SCNC: 30 MMOL/L — SIGNIFICANT CHANGE UP (ref 22–31)
CREAT SERPL-MCNC: 0.95 MG/DL — SIGNIFICANT CHANGE UP (ref 0.5–1.3)
GLUCOSE SERPL-MCNC: 243 MG/DL — HIGH (ref 70–99)
HCT VFR BLD CALC: 27.4 % — LOW (ref 39–50)
HGB BLD-MCNC: 8.7 G/DL — LOW (ref 13–17)
INR BLD: 1.36 — HIGH (ref 0.88–1.17)
MAGNESIUM SERPL-MCNC: 2.5 MG/DL — SIGNIFICANT CHANGE UP (ref 1.6–2.6)
MCHC RBC-ENTMCNC: 28.4 PG — SIGNIFICANT CHANGE UP (ref 27–34)
MCHC RBC-ENTMCNC: 31.8 % — LOW (ref 32–36)
MCV RBC AUTO: 89.5 FL — SIGNIFICANT CHANGE UP (ref 80–100)
NRBC # FLD: 0 — SIGNIFICANT CHANGE UP
PLATELET # BLD AUTO: 281 K/UL — SIGNIFICANT CHANGE UP (ref 150–400)
PMV BLD: 9.7 FL — SIGNIFICANT CHANGE UP (ref 7–13)
POTASSIUM SERPL-MCNC: 3.5 MMOL/L — SIGNIFICANT CHANGE UP (ref 3.5–5.3)
POTASSIUM SERPL-SCNC: 3.5 MMOL/L — SIGNIFICANT CHANGE UP (ref 3.5–5.3)
PROTHROM AB SERPL-ACNC: 15.3 SEC — HIGH (ref 9.8–13.1)
RBC # BLD: 3.06 M/UL — LOW (ref 4.2–5.8)
RBC # FLD: 12.9 % — SIGNIFICANT CHANGE UP (ref 10.3–14.5)
SODIUM SERPL-SCNC: 131 MMOL/L — LOW (ref 135–145)
WBC # BLD: 9.47 K/UL — SIGNIFICANT CHANGE UP (ref 3.8–10.5)
WBC # FLD AUTO: 9.47 K/UL — SIGNIFICANT CHANGE UP (ref 3.8–10.5)

## 2017-07-18 RX ORDER — INSULIN GLARGINE 100 [IU]/ML
15 INJECTION, SOLUTION SUBCUTANEOUS AT BEDTIME
Qty: 0 | Refills: 0 | Status: DISCONTINUED | OUTPATIENT
Start: 2017-07-18 | End: 2017-07-19

## 2017-07-18 RX ORDER — INSULIN LISPRO 100/ML
8 VIAL (ML) SUBCUTANEOUS
Qty: 0 | Refills: 0 | Status: DISCONTINUED | OUTPATIENT
Start: 2017-07-18 | End: 2017-07-19

## 2017-07-18 RX ORDER — LANOLIN ALCOHOL/MO/W.PET/CERES
3 CREAM (GRAM) TOPICAL AT BEDTIME
Qty: 0 | Refills: 0 | Status: DISCONTINUED | OUTPATIENT
Start: 2017-07-18 | End: 2017-07-19

## 2017-07-18 RX ORDER — HALOPERIDOL DECANOATE 100 MG/ML
0.5 INJECTION INTRAMUSCULAR ONCE
Qty: 0 | Refills: 0 | Status: COMPLETED | OUTPATIENT
Start: 2017-07-18 | End: 2017-07-18

## 2017-07-18 RX ADMIN — Medication 4: at 12:02

## 2017-07-18 RX ADMIN — HEPARIN SODIUM 2700 UNIT(S)/HR: 5000 INJECTION INTRAVENOUS; SUBCUTANEOUS at 06:29

## 2017-07-18 RX ADMIN — Medication 6: at 09:12

## 2017-07-18 RX ADMIN — PANTOPRAZOLE SODIUM 40 MILLIGRAM(S): 20 TABLET, DELAYED RELEASE ORAL at 06:14

## 2017-07-18 RX ADMIN — Medication 2 MILLIGRAM(S): at 23:00

## 2017-07-18 RX ADMIN — BRIMONIDINE TARTRATE 1 DROP(S): 2 SOLUTION/ DROPS OPHTHALMIC at 06:13

## 2017-07-18 RX ADMIN — Medication 1 DROP(S): at 06:14

## 2017-07-18 RX ADMIN — Medication 100 MILLIGRAM(S): at 06:14

## 2017-07-18 RX ADMIN — Medication 1 DROP(S): at 18:28

## 2017-07-18 RX ADMIN — Medication 12.5 MILLIGRAM(S): at 06:14

## 2017-07-18 RX ADMIN — FAMOTIDINE 20 MILLIGRAM(S): 10 INJECTION INTRAVENOUS at 12:02

## 2017-07-18 RX ADMIN — Medication 8 UNIT(S): at 18:28

## 2017-07-18 RX ADMIN — Medication 6 UNIT(S): at 12:02

## 2017-07-18 RX ADMIN — ACETAZOLAMIDE 500 MILLIGRAM(S): 250 TABLET ORAL at 12:02

## 2017-07-18 RX ADMIN — Medication 2 DROP(S): at 12:02

## 2017-07-18 RX ADMIN — Medication 6 UNIT(S): at 09:12

## 2017-07-18 RX ADMIN — LISINOPRIL 10 MILLIGRAM(S): 2.5 TABLET ORAL at 06:14

## 2017-07-18 RX ADMIN — Medication 2 DROP(S): at 00:53

## 2017-07-18 RX ADMIN — Medication 2 DROP(S): at 23:00

## 2017-07-18 RX ADMIN — HALOPERIDOL DECANOATE 0.5 MILLIGRAM(S): 100 INJECTION INTRAMUSCULAR at 18:26

## 2017-07-18 RX ADMIN — Medication 2: at 18:28

## 2017-07-18 RX ADMIN — Medication 2 DROP(S): at 06:13

## 2017-07-18 RX ADMIN — AMLODIPINE BESYLATE 5 MILLIGRAM(S): 2.5 TABLET ORAL at 06:14

## 2017-07-18 RX ADMIN — BRIMONIDINE TARTRATE 1 DROP(S): 2 SOLUTION/ DROPS OPHTHALMIC at 18:27

## 2017-07-18 RX ADMIN — INSULIN GLARGINE 15 UNIT(S): 100 INJECTION, SOLUTION SUBCUTANEOUS at 23:00

## 2017-07-18 RX ADMIN — Medication 2 DROP(S): at 18:28

## 2017-07-18 NOTE — PROGRESS NOTE ADULT - ATTENDING COMMENTS
D/w his dtr about poor prognosis. Home hospice.
D/w his dtr about poor prognosis. d/c home with home hospice.
d/w his family at bedside, prognosis poor.

## 2017-07-18 NOTE — PROGRESS NOTE ADULT - SUBJECTIVE AND OBJECTIVE BOX
Chief complaint  Patient is a 81y old  Male who presents with a chief complaint of LE Swelling/SOB (15 Jul 2017 08:44)   Review of systems  Patient in bed, comfortable, no fever,  eating meals, no hypoglycemia.    Labs and Fingesticks    CAPILLARY BLOOD GLUCOSE  218 (18 Jul 2017 11:52)  264 (18 Jul 2017 08:51)  166 (17 Jul 2017 21:52)  251 (17 Jul 2017 18:12)  262 (17 Jul 2017 11:42)  318 (17 Jul 2017 08:43)  221 (16 Jul 2017 22:28)  146 (16 Jul 2017 16:38)  71 (16 Jul 2017 12:54)  83 (16 Jul 2017 07:50)  74 (15 Jul 2017 21:57)  106 (15 Jul 2017 16:40)        Calcium, Total Serum: 8.1 <L> (07-18 @ 05:30)  Calcium, Total Serum: 8.1 <L> (07-17 @ 06:58)          07-18    131<L>  |  88<L>  |  16  ----------------------------<  243<H>  3.5   |  30  |  0.95    Ca    8.1<L>      18 Jul 2017 05:30  Phos  4.9     07-17  Mg     2.5     07-18                          8.7    9.47  )-----------( 281      ( 18 Jul 2017 05:30 )             27.4     Medications  MEDICATIONS  (STANDING):  doxazosin 2 milliGRAM(s) Oral at bedtime  amLODIPine   Tablet 5 milliGRAM(s) Oral daily  lisinopril 10 milliGRAM(s) Oral daily  hydrochlorothiazide 12.5 milliGRAM(s) Oral daily  famotidine    Tablet 20 milliGRAM(s) Oral daily  pantoprazole    Tablet 40 milliGRAM(s) Oral before breakfast  insulin lispro (HumaLOG) corrective regimen sliding scale   SubCutaneous three times a day before meals  insulin lispro (HumaLOG) corrective regimen sliding scale   SubCutaneous at bedtime  dextrose 5%. 1000 milliLiter(s) (50 mL/Hr) IV Continuous <Continuous>  dextrose 50% Injectable 12.5 Gram(s) IV Push once  dextrose 50% Injectable 25 Gram(s) IV Push once  dextrose 50% Injectable 25 Gram(s) IV Push once  docusate sodium 100 milliGRAM(s) Oral three times a day  senna 2 Tablet(s) Oral at bedtime  brimonidine 0.2% Ophthalmic Solution 1 Drop(s) Right EYE two times a day  timolol 0.5% Solution 1 Drop(s) Right EYE two times a day  pilocarpine 4% Solution 2 Drop(s) Right EYE four times a day  heparin  Infusion. 2100 Unit(s)/Hr (21 mL/Hr) IV Continuous <Continuous>  acetazolamide   ER Capsule 500 milliGRAM(s) Oral daily  insulin glargine Injectable (LANTUS) 8 Unit(s) SubCutaneous at bedtime  insulin lispro Injectable (HumaLOG) 6 Unit(s) SubCutaneous three times a day before meals      Physical Exam  General: Patient comfortable in bed  Vital Signs Last 12 Hrs  T(F): 99.2 (07-18-17 @ 14:00), Max: 99.5 (07-18-17 @ 10:54)  HR: 110 (07-18-17 @ 14:00) (110 - 117)  BP: 112/86 (07-18-17 @ 14:00) (109/53 - 120/80)  BP(mean): --  RR: 18 (07-18-17 @ 14:00) (18 - 20)  SpO2: 99% (07-18-17 @ 14:00) (98% - 99%)  Neck: No palpable thyroid nodules.  CVS: S1S2, No murmurs  Respiratory: No wheezing, no crepitations  GI: Abdomen soft, bowel sounds positive  Musculoskeletal: Positive edema lower extremities bilaterally  Skin: No skin rashes, no ecchimosis    Diagnostics

## 2017-07-18 NOTE — PROVIDER CONTACT NOTE (OTHER) - RECOMMENDATIONS
consider speech and swallow test.
Continue to monitor
Hold acetazolamide as per indications.
continue to monitor
Hold pre-meal insulin.

## 2017-07-18 NOTE — PROGRESS NOTE ADULT - SUBJECTIVE AND OBJECTIVE BOX
Patient is a 81y old  Male who presents with a chief complaint of LE Swelling/SOB (15 Jul 2017 08:44)  Patient seen and examined.     Vital Signs Last 24 Hrs  T(C): 37.3 (18 Jul 2017 14:00), Max: 37.5 (18 Jul 2017 01:54)  T(F): 99.2 (18 Jul 2017 14:00), Max: 99.5 (18 Jul 2017 01:54)  HR: 110 (18 Jul 2017 14:00) (79 - 124)  BP: 112/86 (18 Jul 2017 14:00) (109/49 - 133/64)  BP(mean): --  RR: 18 (18 Jul 2017 14:00) (18 - 20)  SpO2: 99% (18 Jul 2017 14:00) (95% - 100%))        PAST MEDICAL & SURGICAL HISTORY:  BPH (benign prostatic hypertrophy)  GERD (gastroesophageal reflux disease)  DVT (deep venous thrombosis)  Pulmonary embolism  Glaucoma  Pancreatic cancer metastasized to liver  Hyperlipidemia  Hypertension  Diabetes mellitus  No significant past surgical history      REVIEW OF SYSTEMS:  Not much obtainable.    RADIOLOGY & ADDITIONAL TESTS:    Imaging Personally Reviewed:  [ ] YES  [ ] NO    Consultant(s) Notes Reviewed:  [ ] YES  [ ] NO    PHYSICAL EXAM:  GENERAL: NAD, well-groomed, well-developed  HEAD:  Atraumatic, Normocephalic  EYES: EOMI, PERRLA, conjunctiva and sclera clear  ENMT: No tonsillar erythema, exudates, or enlargement; Moist mucous membranes, Good dentition, No lesions  NECK: Supple, No JVD, Normal thyroid  NERVOUS SYSTEM:  Lethargic  CHEST/LUNG: Clear to percussion bilaterally; No rales, rhonchi, wheezing, or rubs  HEART: Regular rate and rhythm; No murmurs, rubs, or gallops  ABDOMEN: Soft, Nontender, Nondistended; Bowel sounds present  EXTREMITIES:  2+ Peripheral Pulses, No clubbing, cyanosis, + edema  LYMPH: No lymphadenopathy noted  SKIN: No rashes or lesions    LABS:                                              8.7    9.47  )-----------( 281      ( 18 Jul 2017 05:30 )             27.4     07-18    131<L>  |  88<L>  |  16  ----------------------------<  243<H>  3.5   |  30  |  0.95    Ca    8.1<L>      18 Jul 2017 05:30  Phos  4.9     07-17  Mg     2.5     07-18            MEDICATIONS  (STANDING):  doxazosin 2 milliGRAM(s) Oral at bedtime  amLODIPine   Tablet 5 milliGRAM(s) Oral daily  lisinopril 10 milliGRAM(s) Oral daily  hydrochlorothiazide 12.5 milliGRAM(s) Oral daily  famotidine    Tablet 20 milliGRAM(s) Oral daily  pantoprazole    Tablet 40 milliGRAM(s) Oral before breakfast  insulin lispro (HumaLOG) corrective regimen sliding scale   SubCutaneous three times a day before meals  insulin lispro (HumaLOG) corrective regimen sliding scale   SubCutaneous at bedtime  dextrose 5%. 1000 milliLiter(s) (50 mL/Hr) IV Continuous <Continuous>  dextrose 50% Injectable 12.5 Gram(s) IV Push once  dextrose 50% Injectable 25 Gram(s) IV Push once  dextrose 50% Injectable 25 Gram(s) IV Push once  docusate sodium 100 milliGRAM(s) Oral three times a day  senna 2 Tablet(s) Oral at bedtime  brimonidine 0.2% Ophthalmic Solution 1 Drop(s) Right EYE two times a day  timolol 0.5% Solution 1 Drop(s) Right EYE two times a day  pilocarpine 4% Solution 2 Drop(s) Right EYE four times a day  heparin  Infusion. 2100 Unit(s)/Hr (21 mL/Hr) IV Continuous <Continuous>  acetazolamide   ER Capsule 500 milliGRAM(s) Oral daily    MEDICATIONS  (PRN):  oxyCODONE    5 mG/acetaminophen 325 mG 1 Tablet(s) Oral every 8 hours PRN Severe Pain (7 - 10)  metoclopramide 10 milliGRAM(s) Oral three times a day PRN nausea  acetaminophen   Tablet. 650 milliGRAM(s) Oral every 6 hours PRN mild, moderate pain  dextrose Gel 1 Dose(s) Oral once PRN Blood Glucose LESS THAN 70 milliGRAM(s)/deciliter  glucagon  Injectable 1 milliGRAM(s) IntraMuscular once PRN Glucose LESS THAN 70 milligrams/deciliter  heparin  Injectable 7500 Unit(s) IV Push every 6 hours PRN For aPTT less than 40  heparin  Injectable 3500 Unit(s) IV Push every 6 hours PRN For aPTT between 40 - 57      Care Discussed with Consultants/Other Providers [ ] YES  [ ] NO

## 2017-07-18 NOTE — PROVIDER CONTACT NOTE (OTHER) - ACTION/TREATMENT ORDERED:
provider at bedside evaluating Pt
will switch diet to mechanical soft, and monitor if not tolerating mechanical soft diet will consider speech and swallow test.
will take to endocrinologist in regards of Insulin regimen, continue to monitor.
Hold medication for now, re-schedule medication, continue to monitor. encourage patient to drink fluids.
continue to monitor
continue to monitor.

## 2017-07-19 ENCOUNTER — TRANSCRIPTION ENCOUNTER (OUTPATIENT)
Age: 82
End: 2017-07-19

## 2017-07-19 VITALS
HEART RATE: 110 BPM | SYSTOLIC BLOOD PRESSURE: 129 MMHG | RESPIRATION RATE: 19 BRPM | TEMPERATURE: 99 F | OXYGEN SATURATION: 98 % | DIASTOLIC BLOOD PRESSURE: 67 MMHG

## 2017-07-19 LAB
APTT BLD: 79.5 SEC — HIGH (ref 27.5–37.4)
BUN SERPL-MCNC: 16 MG/DL — SIGNIFICANT CHANGE UP (ref 7–23)
CALCIUM SERPL-MCNC: 8.2 MG/DL — LOW (ref 8.4–10.5)
CHLORIDE SERPL-SCNC: 91 MMOL/L — LOW (ref 98–107)
CO2 SERPL-SCNC: 30 MMOL/L — SIGNIFICANT CHANGE UP (ref 22–31)
CREAT SERPL-MCNC: 0.87 MG/DL — SIGNIFICANT CHANGE UP (ref 0.5–1.3)
GLUCOSE SERPL-MCNC: 228 MG/DL — HIGH (ref 70–99)
HCT VFR BLD CALC: 27.6 % — LOW (ref 39–50)
HGB BLD-MCNC: 9 G/DL — LOW (ref 13–17)
MCHC RBC-ENTMCNC: 28.9 PG — SIGNIFICANT CHANGE UP (ref 27–34)
MCHC RBC-ENTMCNC: 32.6 % — SIGNIFICANT CHANGE UP (ref 32–36)
MCV RBC AUTO: 88.7 FL — SIGNIFICANT CHANGE UP (ref 80–100)
NRBC # FLD: 0 — SIGNIFICANT CHANGE UP
PLATELET # BLD AUTO: 301 K/UL — SIGNIFICANT CHANGE UP (ref 150–400)
PMV BLD: 9.7 FL — SIGNIFICANT CHANGE UP (ref 7–13)
POTASSIUM SERPL-MCNC: 3.3 MMOL/L — LOW (ref 3.5–5.3)
POTASSIUM SERPL-SCNC: 3.3 MMOL/L — LOW (ref 3.5–5.3)
RBC # BLD: 3.11 M/UL — LOW (ref 4.2–5.8)
RBC # FLD: 13 % — SIGNIFICANT CHANGE UP (ref 10.3–14.5)
SODIUM SERPL-SCNC: 132 MMOL/L — LOW (ref 135–145)
WBC # BLD: 9.47 K/UL — SIGNIFICANT CHANGE UP (ref 3.8–10.5)
WBC # FLD AUTO: 9.47 K/UL — SIGNIFICANT CHANGE UP (ref 3.8–10.5)

## 2017-07-19 RX ORDER — INSULIN GLARGINE 100 [IU]/ML
18 INJECTION, SOLUTION SUBCUTANEOUS AT BEDTIME
Qty: 0 | Refills: 0 | Status: DISCONTINUED | OUTPATIENT
Start: 2017-07-19 | End: 2017-07-19

## 2017-07-19 RX ORDER — ENOXAPARIN SODIUM 100 MG/ML
40 INJECTION SUBCUTANEOUS
Qty: 30 | Refills: 0 | OUTPATIENT
Start: 2017-07-19 | End: 2017-08-18

## 2017-07-19 RX ORDER — ENOXAPARIN SODIUM 100 MG/ML
140 INJECTION SUBCUTANEOUS ONCE
Qty: 0 | Refills: 0 | Status: COMPLETED | OUTPATIENT
Start: 2017-07-19 | End: 2017-07-19

## 2017-07-19 RX ORDER — INSULIN LISPRO 100/ML
10 VIAL (ML) SUBCUTANEOUS
Qty: 0 | Refills: 0 | Status: DISCONTINUED | OUTPATIENT
Start: 2017-07-19 | End: 2017-07-19

## 2017-07-19 RX ORDER — ENOXAPARIN SODIUM 100 MG/ML
130 INJECTION SUBCUTANEOUS
Qty: 0 | Refills: 0 | COMMUNITY

## 2017-07-19 RX ADMIN — FAMOTIDINE 20 MILLIGRAM(S): 10 INJECTION INTRAVENOUS at 12:28

## 2017-07-19 RX ADMIN — HEPARIN SODIUM 2700 UNIT(S)/HR: 5000 INJECTION INTRAVENOUS; SUBCUTANEOUS at 06:50

## 2017-07-19 RX ADMIN — OXYCODONE AND ACETAMINOPHEN 1 TABLET(S): 5; 325 TABLET ORAL at 06:11

## 2017-07-19 RX ADMIN — Medication 1 DROP(S): at 06:11

## 2017-07-19 RX ADMIN — ENOXAPARIN SODIUM 140 MILLIGRAM(S): 100 INJECTION SUBCUTANEOUS at 18:38

## 2017-07-19 RX ADMIN — Medication 8 UNIT(S): at 12:26

## 2017-07-19 RX ADMIN — Medication 1 DROP(S): at 18:18

## 2017-07-19 RX ADMIN — AMLODIPINE BESYLATE 5 MILLIGRAM(S): 2.5 TABLET ORAL at 06:11

## 2017-07-19 RX ADMIN — OXYCODONE AND ACETAMINOPHEN 1 TABLET(S): 5; 325 TABLET ORAL at 07:07

## 2017-07-19 RX ADMIN — LISINOPRIL 10 MILLIGRAM(S): 2.5 TABLET ORAL at 06:11

## 2017-07-19 RX ADMIN — ACETAZOLAMIDE 500 MILLIGRAM(S): 250 TABLET ORAL at 12:28

## 2017-07-19 RX ADMIN — Medication 2 DROP(S): at 12:28

## 2017-07-19 RX ADMIN — PANTOPRAZOLE SODIUM 40 MILLIGRAM(S): 20 TABLET, DELAYED RELEASE ORAL at 06:11

## 2017-07-19 RX ADMIN — BRIMONIDINE TARTRATE 1 DROP(S): 2 SOLUTION/ DROPS OPHTHALMIC at 06:11

## 2017-07-19 RX ADMIN — Medication 4: at 12:26

## 2017-07-19 RX ADMIN — BRIMONIDINE TARTRATE 1 DROP(S): 2 SOLUTION/ DROPS OPHTHALMIC at 18:18

## 2017-07-19 RX ADMIN — Medication 2 DROP(S): at 18:18

## 2017-07-19 RX ADMIN — Medication 8 UNIT(S): at 09:20

## 2017-07-19 RX ADMIN — Medication 4: at 09:20

## 2017-07-19 RX ADMIN — Medication 2 DROP(S): at 06:11

## 2017-07-19 RX ADMIN — Medication 10 UNIT(S): at 18:13

## 2017-07-19 RX ADMIN — Medication 12.5 MILLIGRAM(S): at 06:11

## 2017-07-19 RX ADMIN — Medication 2: at 18:13

## 2017-07-19 NOTE — DISCHARGE NOTE ADULT - CARE PLAN
Principal Discharge DX:	Other acute pulmonary embolism with acute cor pulmonale  Goal:	continue the lovenox  Instructions for follow-up, activity and diet:	continue the lovenox

## 2017-07-19 NOTE — PROGRESS NOTE ADULT - PROBLEM SELECTOR PROBLEM 3
Hypertension
Pancreatic cancer metastasized to liver
Pancreatic cancer metastasized to liver
Hypertension
Pancreatic cancer metastasized to liver

## 2017-07-19 NOTE — DISCHARGE NOTE ADULT - VISION (WITH CORRECTIVE LENSES IF THE PATIENT USUALLY WEARS THEM):
L eye blindness/Normal vision: sees adequately in most situations; can see medication labels, newsprint

## 2017-07-19 NOTE — PROGRESS NOTE ADULT - ASSESSMENT
Assessment  DMT2: 81y Male with DM T2 with hyperglycemia on basal insulin,  eating meals now, sugars high.  HTN: uncontrolled on meds.  HLD:  on statin, tolerating.  Pulmonary embolism: on treatment.

## 2017-07-19 NOTE — DISCHARGE NOTE ADULT - MEDICATION SUMMARY - MEDICATIONS TO TAKE
I will START or STAY ON the medications listed below when I get home from the hospital:    oxycodone-acetaminophen 5 mg-300 mg oral tablet  -- 1-2 tab(s) by mouth every 8 hours, As Needed  -- Indication: For Pain    morphine 30 mg/12 hr oral capsule, extended release  -- 1 cap(s) by mouth every 12 hours  -- Indication: For Pain    doxazosin 2 mg oral tablet  -- 1 tab(s) by mouth once a day  -- Indication: For Prostate    Lovenox 40 mg/0.4 mL injectable solution  -- 40 milligram(s) injectable once a day, you will be giving this along with the 100mg you already have at home   -- It is very important that you take or use this exactly as directed.  Do not skip doses or discontinue unless directed by your doctor.    -- Indication: For take with the 100mg lovenox you have at home     Levemir 100 units/mL subcutaneous solution  -- 45 unit(s) subcutaneous once a day in the morning  -- Indication: For Diabetes mellitus    Levemir 100 units/mL subcutaneous solution  -- 28 unit(s) subcutaneous once a day (at bedtime)  -- Indication: For Diabetes mellitus    metoclopramide 10 mg oral tablet  -- 1 tab(s) by mouth 3 times a day, As Needed for nausea  -- Indication: For Nausea    amlodipine-benazepril 5 mg-10 mg oral capsule  -- 1 cap(s) by mouth once a day  -- Indication: For HTN    hydroCHLOROthiazide 12.5 mg oral capsule  -- 1 cap(s) by mouth once a day  -- Indication: For HTN    acetaZOLAMIDE 500 mg oral capsule, extended release  -- 1 cap(s) by mouth once a day  -- Indication: For HTN    Linzess 145 mcg oral capsule  -- 1 cap(s) by mouth once a day  -- Indication: For Bowel    raNITIdine 150 mg oral capsule  -- 1 cap(s) by mouth 2 times a day  -- Indication: For GERD (gastroesophageal reflux disease)    Combigan 0.2%-0.5% ophthalmic solution  -- 1 drop(s) to each affected eye every 12 hours  -- Indication: For eye drops    pilocarpine 4% ophthalmic solution  -- 2 drop(s) to each affected eye 4 times a day  -- Indication: For eye drops    omeprazole 20 mg oral delayed release capsule  -- 1 cap(s) by mouth once a day  -- Indication: For GERD (gastroesophageal reflux disease)

## 2017-07-19 NOTE — PROGRESS NOTE ADULT - PROBLEM SELECTOR PLAN 4
RISS
Will continue statin, primary team FU

## 2017-07-19 NOTE — DISCHARGE NOTE ADULT - HOSPITAL COURSE
This is a 80 yo M admited with worsening lower ext swelling and SOB. Patient with metastic pancreatic CA. Patient found to have extensive bilateral pulmonary emboli and DVT's. Patient with right heart strain. Patient had EKG, CXR,LABS, tele monitoring for arrythmia . EKG with sinus tach , cardiac enzymes negative x2 . CTA chest , abd, pelvis done. Necrotic mass in the region of the pancraetic uncinate. CT head no acute intracranial hemorrhage. Onc consulted ok for d/c on 140mg SQ daily. Can follow up with his oncologist outpatient. Hospice care set up and patient stable for discharge to home 7/19 as per Dr García.

## 2017-07-19 NOTE — PROGRESS NOTE ADULT - SUBJECTIVE AND OBJECTIVE BOX
Chief complaint  Patient is a 81y old  Male who presents with a chief complaint of LE Swelling/SOB (15 Jul 2017 08:44)   Review of systems  Patient in bed, comfortable, no fever, eating meals, no hypoglycemia.    Labs and Fingesticks    CAPILLARY BLOOD GLUCOSE  223 (19 Jul 2017 11:50)  248 (19 Jul 2017 08:31)  182 (18 Jul 2017 22:52)  200 (18 Jul 2017 16:54)  218 (18 Jul 2017 11:52)  264 (18 Jul 2017 08:51)  166 (17 Jul 2017 21:52)  251 (17 Jul 2017 18:12)  262 (17 Jul 2017 11:42)  318 (17 Jul 2017 08:43)  221 (16 Jul 2017 22:28)  146 (16 Jul 2017 16:38)        Calcium, Total Serum: 8.2 <L> (07-19 @ 05:40)  Calcium, Total Serum: 8.1 <L> (07-18 @ 05:30)          07-19    132<L>  |  91<L>  |  16  ----------------------------<  228<H>  3.3<L>   |  30  |  0.87    Ca    8.2<L>      19 Jul 2017 05:40  Mg     2.5     07-18                          9.0    9.47  )-----------( 301      ( 19 Jul 2017 05:40 )             27.6     Medications  MEDICATIONS  (STANDING):  doxazosin 2 milliGRAM(s) Oral at bedtime  amLODIPine   Tablet 5 milliGRAM(s) Oral daily  lisinopril 10 milliGRAM(s) Oral daily  hydrochlorothiazide 12.5 milliGRAM(s) Oral daily  famotidine    Tablet 20 milliGRAM(s) Oral daily  pantoprazole    Tablet 40 milliGRAM(s) Oral before breakfast  insulin lispro (HumaLOG) corrective regimen sliding scale   SubCutaneous three times a day before meals  insulin lispro (HumaLOG) corrective regimen sliding scale   SubCutaneous at bedtime  dextrose 5%. 1000 milliLiter(s) (50 mL/Hr) IV Continuous <Continuous>  dextrose 50% Injectable 12.5 Gram(s) IV Push once  dextrose 50% Injectable 25 Gram(s) IV Push once  dextrose 50% Injectable 25 Gram(s) IV Push once  docusate sodium 100 milliGRAM(s) Oral three times a day  senna 2 Tablet(s) Oral at bedtime  brimonidine 0.2% Ophthalmic Solution 1 Drop(s) Right EYE two times a day  timolol 0.5% Solution 1 Drop(s) Right EYE two times a day  pilocarpine 4% Solution 2 Drop(s) Right EYE four times a day  heparin  Infusion. 2100 Unit(s)/Hr (21 mL/Hr) IV Continuous <Continuous>  acetazolamide   ER Capsule 500 milliGRAM(s) Oral daily  insulin glargine Injectable (LANTUS) 15 Unit(s) SubCutaneous at bedtime  insulin lispro Injectable (HumaLOG) 8 Unit(s) SubCutaneous three times a day before meals      Physical Exam  General: Patient comfortable in bed  Vital Signs Last 12 Hrs  T(F): 98.6 (07-19-17 @ 14:00), Max: 98.8 (07-19-17 @ 10:13)  HR: 109 (07-19-17 @ 14:00) (108 - 109)  BP: 128/64 (07-19-17 @ 14:00) (116/71 - 128/64)  BP(mean): --  RR: 19 (07-19-17 @ 14:00) (19 - 20)  SpO2: 99% (07-19-17 @ 14:00) (99% - 99%)  Neck: No palpable thyroid nodules.  CVS: S1S2, No murmurs  Respiratory: No wheezing, no crepitations  GI: Abdomen soft, bowel sounds positive  Musculoskeletal: Positive edema lower extremities bilaterally  Skin: No skin rashes, no ecchimosis    Diagnostics

## 2017-07-19 NOTE — PROGRESS NOTE ADULT - PROBLEM SELECTOR PROBLEM 4
Hyperlipidemia
Diabetes mellitus
Diabetes mellitus
Hyperlipidemia
Diabetes mellitus

## 2017-07-19 NOTE — PROGRESS NOTE ADULT - PROBLEM SELECTOR PROBLEM 2
Pulmonary embolism

## 2017-07-19 NOTE — DISCHARGE NOTE ADULT - CONDITIONS AT DISCHARGE
Pt ao1 out of bed with 2 assist. VS stable. HR runs slightly high.  No chest pain noted & lungs are clear. Pt in NAD. Pt is very weak and unsteady on feet. Pt discharged. IV removed, tele removed.

## 2017-07-19 NOTE — DISCHARGE NOTE ADULT - PROVIDER TOKENS
FREE:[LAST:[Please call your PMD for an appoinmtment wtihin 2 weeks],PHONE:[(   )    -],FAX:[(   )    -]]

## 2017-07-19 NOTE — DISCHARGE NOTE ADULT - PATIENT PORTAL LINK FT
“You can access the [facility] Patient Portal, offered by [facility], by registering with the following website: [email or web address]”

## 2017-07-19 NOTE — PROGRESS NOTE ADULT - PROBLEM SELECTOR PROBLEM 1
DVT (deep venous thrombosis)
DVT (deep venous thrombosis)
Diabetes mellitus
DVT (deep venous thrombosis)

## 2017-07-19 NOTE — PROGRESS NOTE ADULT - PROBLEM SELECTOR PLAN 2
c/w Heparin drip
c/w Heparin drip
On meds primary team following up
c/w Heparin drip
On meds primary team following up

## 2017-07-19 NOTE — PROGRESS NOTE ADULT - PROBLEM SELECTOR PLAN 1
C/w heparin drip
Will increase Lantus to 18 units at bed time.  Will increase Humalog to 10 units before each meal in addition to Humalog correction scale coverage.  Patient counseled for compliance with consistent low carb diet.  Will request diabetic teaching since patient will be discharged home on insulin.
Will increase Lantus to 8 units at bed time.  Will increase Humalog to 8 units before each meal in addition to Humalog correction scale coverage.  Patient counseled for compliance with consistent low carb diet.  Will request diabetic teaching since patient will be discharged home on insulin.
Will start on Lantus to 8 units at bed time.  Will start on Humalog to 6 units before each meal in addition to Humalog correction scale coverage.  Patient/family counseled for compliance with consistent low carb diet.
Will DC premeal insulin, monitor FS will FU.

## 2018-04-19 NOTE — PROGRESS NOTE ADULT - PROBLEM SELECTOR PLAN 3
Seen by Onc, palliative f/u
Seen by Onc, palliative f/u
Controlled, primary team FU
Seen by Onc, palliative f/u
Controlled, primary team FU
I will STOP taking the medications listed below when I get home from the hospital:  None

## 2018-07-23 NOTE — ED ADULT TRIAGE NOTE - RESPIRATORY RATE (BREATHS/MIN)
0840- PRN Medication Documentation    Specific patient behavior that led to need for PRN medication: pt leaving treatment team meeting demanding ativan for anxiety  Staff interventions attempted prior to PRN being given: education, coping skills, therapeutic communication,   Hydroxyzine po prn offered as first line anti anxiety medication  PRN medication given: po 25 mg hydroxyzine   Patient response/effectiveness of PRN medication: pt smiling calm polite using journal 20

## 2019-03-18 NOTE — ED ADULT NURSE REASSESSMENT NOTE - NS ED NURSE REASSESS COMMENT FT1
Chief Complaint   Patient presents with    Leg Swelling     right leg      she is a 78y.o. year old female who presents with complaint of right leg swelling. She complains of continued left shoulder pain sans acute injury. Patient with hx of CAD, HTN, HLD, GERD, B12 deficiency, blindness, anxiety and depression. Patient denies HA, dizziness, SOB, CP, abdominal pain, dysuria. Hypertension:  The patient reports: Has run out of medication, no medication side effects noted, no TIA's, no chest pain on exertion, no dyspnea on exertion, no swelling of ankles. Lifestyle modification/social history: sedentary     BP Readings from Last 3 Encounters:   03/08/19 145/83   01/25/19 141/84   08/30/18 144/80       Patient advised to log blood pressures at home 3-5 times monthly and bring to next visit. Call office as soon as possible if BP's over 140/90 on multiple occasions or with symptoms of dizziness, chest pain, shortness of breath, headache or ankle swelling. Our goal is to normalize the blood pressure to decrease the risks of strokes and heart attacks. The patient is in agreement with the plan.     Hyperlipidemia    Cardiovascular risks for her are: Age, HTN, CAD    Our goal is to lower hyperlipidemia to decrease the risks of strokes and heart attacks      Patient Active Problem List   Diagnosis Code    Anxiety F41.9    Hyperlipidemia E78.5    CAD (coronary artery disease) I25.10    Blindness H54.7    HTN (hypertension) I10    Osteopenia M85.80    B12 deficiency E53.8    Insomnia G47.00    Allergic rhinitis J30.9    Gastroesophageal reflux disease without esophagitis K21.9    Moderate episode of recurrent major depressive disorder ([facility] [address] F33.1     Past Surgical History:   Procedure Laterality Date    CABG, VEIN, FIVE  2006    CARDIAC SURG PROCEDURE UNLIST      HX ORTHOPAEDIC       Social History     Socioeconomic History    Marital status:      Spouse name: Not on file    Number of children: Not on file    Years of education: Not on file    Highest education level: Not on file   Social Needs    Financial resource strain: Not on file    Food insecurity - worry: Not on file    Food insecurity - inability: Not on file    Transportation needs - medical: Not on file   Hyperic needs - non-medical: Not on file   Occupational History    Not on file   Tobacco Use    Smoking status: Never Smoker    Smokeless tobacco: Never Used   Substance and Sexual Activity    Alcohol use: Yes     Comment: social    Drug use: No    Sexual activity: Yes     Partners: Male     Birth control/protection: None   Other Topics Concern    Not on file   Social History Narrative    Not on file     Family History   Problem Relation Age of Onset    No Known Problems Mother     No Known Problems Father      Current Outpatient Medications   Medication Sig    gabapentin (NEURONTIN) 300 mg capsule Take 1 Cap by mouth three (3) times daily as needed for Pain.  simvastatin (ZOCOR) 40 mg tablet TAKE ONE TABLET BY MOUTH NIGHTLY    buPROPion XL (WELLBUTRIN XL) 150 mg tablet TAKE ONE TABLET BY MOUTH EVERY MORNING    LORazepam (ATIVAN) 1 mg tablet Take 1 Tab by mouth every eight (8) hours as needed for Anxiety.  miscellaneous medical supply (BLOOD PRESSURE CUFF) misc Blood Pressure Cuff That Talks.  esomeprazole (NEXIUM) 40 mg capsule Take 1 Cap by mouth daily.  clopidogrel (PLAVIX) 75 mg tab TAKE 1 TABLET BY MOUTH DAILY    raNITIdine (ZANTAC) 150 mg tablet TAKE 1 TABLET BY MOUTH NIGHTLY    atenolol (TENORMIN) 50 mg tablet TAKE ONE TABLET BY MOUTH DAILY    furosemide (LASIX) 20 mg tablet TAKE ONE TABLET BY MOUTH EVERY DAY    albuterol (PROVENTIL HFA, VENTOLIN HFA, PROAIR HFA) 90 mcg/actuation inhaler Take 2 Puffs by inhalation every four (4) hours as needed for Wheezing.     potassium chloride SA (MICRO-K) 10 mEq capsule TAKE ONE CAPSULE BY MOUTH TWICE A DAY    nitroglycerin (NITROSTAT) 0.4 mg SL tablet 1 Pt had a bed assignment at 746c, make a x3 call but no one picking up the phone CN made aware Tab by SubLINGual route as needed for Chest Pain.  fluticasone (FLONASE) 50 mcg/actuation nasal spray USE 2 SPRAYS IN BOTH NOSTRILS DAILY    lidocaine (XYLOCAINE) 4 % topical cream Apply  to affected area four (4) times daily as needed for Pain.  acetaminophen (TYLENOL ARTHRITIS PAIN) 650 mg CR tablet Take 1 Tab by mouth every six (6) hours as needed for Pain.  promethazine (PHENERGAN) 25 mg tablet Take 1 Tab by mouth every six (6) hours as needed for Nausea.  cetirizine (ZYRTEC) 10 mg tablet Take 1 Tab by mouth daily.  calcitRIOL (ROCALTROL) 0.25 mcg capsule Take 1 Cap by mouth daily.  triamcinolone acetonide (KENALOG) 0.1 % ointment Apply  to affected area two (2) times a day. use thin layer    aspirin 81 mg tablet Take 81 mg by mouth.  traZODone (DESYREL) 50 mg tablet TAKE ONE TABLET BY MOUTH NIGHTLY  Indications: insomnia associated with depression    cyanocobalamin 1,000 mcg tablet Take 1 Tab by mouth daily.  folic acid (FOLVITE) 1 mg tablet Take 1 Tab by mouth daily. No current facility-administered medications for this visit.       Allergies   Allergen Reactions    Percocet [Oxycodone-Acetaminophen] Itching       Review of Systems:  Constitutional: Feeling well, denies fatigue, malaise  Skin: Negative for rash or lesion  HEENT: see HPI, Negative for acute hearing changes  Respiratory: Negative for cough, wheezing or SOB  Cardiovascular: Negative for chest pain, dizziness or palpitations  Gastrointestinal: Negative for nausea or abdominal pain  Genital/urinary: Negative for dysuria or voiding dysfunction  Musculoskeletal: c/o left shoulder arthralgia, right knee swelling   Neurological: Negative for HA, weakness or paresthesia  Psychlogical: Negative for depression or anxiety     Vitals:    03/08/19 1102   BP: 145/83   Pulse: 71   Resp: 16   Temp: 97.8 °F (36.6 °C)   TempSrc: Oral   SpO2: 96%   Weight: 169 lb (76.7 kg)   Height: 5' 2\" (1.575 m)       Physical Examination:  General: Well developed, well nourished, in no acute distress  Skin: Warm and dry, no rash or lesion appreciated  Head: Normocephalic, atraumatic  Neck: Normal range of motion  Respiratory: Clear to auscultation bilaterally with symmetrical effort  Cardiovascular: Normal S1, S2, Regular rate and rhythm  Extremities: left shoulder with full range of motion, proximal bicep TTP  Neurological: Normal strength and sensation. No focal deficits  Psychological: Active, alert and oriented. Affect appropriate       Diagnoses and all orders for this visit:    1. Right knee pain, unspecified chronicity  -     XR KNEE RT MAX 2 VWS; Future  -     gabapentin (NEURONTIN) 300 mg capsule; Take 1 Cap by mouth three (3) times daily as needed for Pain. 2. Acute pain of left shoulder  -     gabapentin (NEURONTIN) 300 mg capsule; Take 1 Cap by mouth three (3) times daily as needed for Pain. 3. Hyperlipidemia, unspecified hyperlipidemia type  -     LIPID PANEL  -     METABOLIC PANEL, COMPREHENSIVE    4. Coronary artery disease due to lipid rich plaque  -     LIPID PANEL  -     CBC W/O DIFF    5. Essential hypertension  -     METABOLIC PANEL, COMPREHENSIVE  -     TSH 3RD GENERATION         Importance of compliance with all prescribed medications discussed. Continue current prescribed medications as written. I have discussed the diagnosis with the patient and the intended plan as seen in the above orders. The patient expresses understanding and agreement with our plan of care. All of the patient's questions were answered to apparent satisfaction. The patient has received an after-visit summary. The patient knows to call our office if there are any questions or concerns regarding diagnosis and treatment plans. I have discussed medication side effects and warnings with the patient as well. Follow-up Disposition:  Return in about 3 months (around 6/8/2019), or if symptoms worsen or fail to improve.

## 2022-09-28 NOTE — CONSULT NOTE ADULT - CONSULT REQUESTED BY NAME
Dr. García Detail Level: Detailed Depth Of Biopsy: dermis Was A Bandage Applied: Yes Size Of Lesion In Cm: 0.3 X Size Of Lesion In Cm: 0 Biopsy Type: H and E Biopsy Method: Dermablade Anesthesia Type: 1% lidocaine without epinephrine Anesthesia Volume In Cc (Will Not Render If 0): 0.7 Hemostasis: Electrodesiccation Wound Care: No ointment Dressing: Band-Aid Destruction After The Procedure: No Type Of Destruction Used: Curettage Curettage Text: The wound bed was treated with curettage after the biopsy was performed. Cryotherapy Text: The wound bed was treated with cryotherapy after the biopsy was performed. Electrodesiccation Text: The wound bed was treated with electrodesiccation after the biopsy was performed. Electrodesiccation And Curettage Text: The wound bed was treated with electrodesiccation and curettage after the biopsy was performed. Silver Nitrate Text: The wound bed was treated with silver nitrate after the biopsy was performed. Lab: 6 Consent: Verbal consent was obtained and risks were reviewed including but not limited to scarring, infection, bleeding, scabbing, incomplete removal, nerve damage and allergy to anesthesia. Post-Care Instructions: Patient is to keep the biopsy site dry overnight, wash daily with mild soap and water, apply bacitracin or petroleum jelly, and cover with bandaid until healed approximately 1-2 weeks. Notification Instructions: Patient will be notified of biopsy results. However, patient instructed to call the office if not contacted within 2 weeks. Billing Type: Third-Party Bill Information: Selecting Yes will display possible errors in your note based on the variables you have selected. This validation is only offered as a suggestion for you. PLEASE NOTE THAT THE VALIDATION TEXT WILL BE REMOVED WHEN YOU FINALIZE YOUR NOTE. IF YOU WANT TO FAX A PRELIMINARY NOTE YOU WILL NEED TO TOGGLE THIS TO 'NO' IF YOU DO NOT WANT IT IN YOUR FAXED NOTE.

## 2023-01-18 NOTE — ED ADULT NURSE NOTE - ASSOCIATED SYMPTOMS
Get blood work today  Will give dose of IV diuretic today   Take 40 mg of lasix in AM tomorrow and Friday then return to 20 mg Lasix daily  Continue rest of current cardiac medications  Elevate legs as much as possible   Wear compression stockings and take transitioning from sitting to standing slowly  Follow up with CHF clinic in 1 week  Follow up with Dr. Jonnie Orellana in 1-2 months     Weigh yourself daily    -Stay Hydrated, 64 oz     -Diet should sodium restricted to 2 grams    -Again watch your daily weight trends and if you gain water weight please follow below instructions.    -If you gain 3-5 pounds in 2-3 days OR notice that you are retaining fluid in anyway just like you did before then take an extra dose of your water pill (Lasix) every day until you lose the weight or feel better.     -If you notice that you have taken more than 2 extra doses in 1 week then please call and let us know. -If at any time you feel that you are retaining fluid, your medications are not working, or you feel ill in anyway, then please call us for either same day appointment or the next day, and for instructions. Our goal is to keep you out of the emergency room and the hospital and we have ways to do it. You just need to call us in a timely manner.     -If you become sick for other reasons, and notice that you are not urinating as much, the urine is very dark, you have significant diarrhea or vomiting, then please DO NOT take your water pill and CALL US immediately. Advance Directives: Care Instructions  Overview  An advance directive is a legal way to state your wishes at the end of your life. It tells your family and your doctor what to do if you can't say what you want. There are two main types of advance directives. You can change them any time your wishes change. Living will. This form tells your family and your doctor your wishes about life support and other treatment.  The form is also called a declaration. Medical power of . This form lets you name a person to make treatment decisions for you when you can't speak for yourself. This person is called a health care agent (health care proxy, health care surrogate). The form is also called a durable power of  for health care. If you do not have an advance directive, decisions about your medical care may be made by a family member, or by a doctor or a  who doesn't know you. It may help to think of an advance directive as a gift to the people who care for you. If you have one, they won't have to make tough decisions by themselves. For more information, including forms for your state, see the [address] website (www.caringinfo.org/planning/advance-directives/). Follow-up care is a key part of your treatment and safety. Be sure to make and go to all appointments, and call your doctor if you are having problems. It's also a good idea to know your test results and keep a list of the medicines you take. What should you include in an advance directive? Many states have a unique advance directive form. (It may ask you to address specific issues.) Or you might use a universal form that's approved by many states. If your form doesn't tell you what to address, it may be hard to know what to include in your advance directive. Use the questions below to help you get started. Who do you want to make decisions about your medical care if you are not able to? What life-support measures do you want if you have a serious illness that gets worse over time or can't be cured? What are you most afraid of that might happen? (Maybe you're afraid of having pain, losing your independence, or being kept alive by machines.)  Where would you prefer to die? (Your home? A hospital? A nursing home?)  Do you want to donate your organs when you die? Do you want certain Christian practices performed before you die? When should you call for help?   Be sure to contact your doctor if you have any questions. Where can you learn more? Go to http://www.winkler.com/ and enter R264 to learn more about \"Advance Directives: Care Instructions. \"  Current as of: June 16, 2022               Content Version: 13.5  © 5215-9872 [employer]. Care instructions adapted under license by [facility] ([facility]). If you have questions about a medical condition or this instruction, always ask your healthcare professional. [address] any warranty or liability for your use of this information. HEART FAILURE  / CONGESTIVE HEART FAILURE  DISCHARGE INSTRUCTIONS:  GUIDELINES TO FOLLOW AT HOME    Self- Managed Care:     MEDICATIONS:  Take your medication as directed. If you are experiencing any side effects, inform your doctor, Do not stop taking any of your medications without letting your doctor know. Check with your doctor before taking any over-the-counter medications / herbal / or dietary supplements. They may interfere with your other medications. Do not take ibuprofen (Advil or Motrin) and naproxen (Aleve) without talking to your doctor first. They could make your heart failure worse. WEIGHT MONITORING:   Weigh yourself everyday (with the same scale) around the same time of the day and write it down. (you can chart them on a calendar or keep track of them on paper. Notify your doctor of a weight gain of 3 pounds or more in 1 day   OR a total of 5 pounds or more in 1 week    Take your weight record to your doctor visits  Also, the same goes if you loose more than 3# in one day, let your heart doctor know. DIET:   Cardiac heart healthy diet- Low saturated / low trans fat, no added salt, caffeine restricted, Low sodium diet-   No more than 2,000mg (2 grams) of salt / sodium per day (which equals to a little less than  a teaspoon of salt)  If your doctor wants you on a fluid restriction. ..it is usually recommended a fluid limit of 2,000cc -  Fluid restriction- 2,000 ml (milliliters) = 64 ounces = you can have 8 glasses of fluid per day (each glass 8 ounces)    Follow a low salt diet - avoid using salt at the table, avoid / limit use of canned soups, processed / packaged foods, salted snacks, olives and pickles. Do not use a salt substitute without checking with your doctor, they may contain a high amount of potassioum. (Mrs. Barbie Hayward is safe to use). Limit the use of alcohol       CALL YOUR DOCTOR THE FIRST DAY YOU NOTICE ANY OF THESE   SYMPTOMS:  You have a weight gain of 3 pounds or more in 1 day         OR 5 pounds or more in one week  More shortness of breath  More swelling of your stomach, legs, ankles or feet  Feeling more tired, No energy  Dry hacky cough  Dizziness  More chest pain / discomfort       (CALL 911 IF ANY OF THE FOLLOWING OCCURS  Chest pain (not relieved with nitroglycerine, if you have been prescribed this medication)  Severe shortness of breath  Faint / Pass out  Confusion / cannot think clearly  If symptoms get worse           SMOKING - TOBACCO USE:  * IF YOU SMOKE - STOP! Kick the habit. [address] Program is offered at [facility] [address] and [address] [facility]. Call [phone] extension [phone] for more information. ACTIVITY:   (Ask your doctor when you will be able to return to work and before starting any exercise program.  Do not drive unless unless your doctor has given you permission to do so). Start light exercise. Even if you can only do a small amount, exercise will help you get stronger, have more energy, help manage your weight and decrease  stress. Walking is an easy way to get exercise.  Start out slowly and  increase the amount you walk as tolerated  If you become short of breath, dizzy or have chest pain; stop, sit down, and rest.  If you feel \"wiped out\" the day after you exercise, walk at a slower pace or for a shorter distance. You can gradually increase the pace or amount of time. (Do not exercise right after a meal or in extreme temperatures, such as above 85 degrees, if the air is really humid, or wind chill is less than 20 degrees)                                             ADDITIONAL INFORMATION:  Avoid getting sick from colds and the flu. Stay away from friends or family that you know may have a contagious illness  Get plenty of rest   Get a flu shot each year. Get a pneumococcal vaccine shot. If you have had one before, ask your doctor whether you need another dose. My Goal for Self-management of Heart Failure Includes 5 steps :    1. Notice a change in symptoms ( weight gain, short of breath, leg swelling, decreased activity level, bloating. ...)    2. Evaluate the change: (use the Heart Failure Zones )     3. Decide to take action: decide what your options are, such as: (call your doctor for an extra visit, take a prescribed medication, such as your water pill if your doctor has given you directions to do so, [address])    4. Come up with a strategy:  (now you call the doctor for advice / appointment. This is where you take action!!! Do not wait, catch the symptom early and treat it before it worsens. 5. Evaluate the response: The next day, check your Heart Failure Zones: are you in the GREEN ZONE (safe zone)? Worsening symptoms of YELLOW ZONE? Or have you moved to the RED ZONE and need to call 911 or go to the Emergency Room for evaluation? Call your doctor's office to update them on your symptoms of heart failure. Learning About a Wearable Cardioverter-Defibrillator ([address])  What is it? A wearable cardioverter-defibrillator (WCD) is a vest that has a defibrillator built into it. A defibrillator is a device that fixes serious changes in your heartbeat. The device is always checking your heart rate and rhythm.  If it detects a life-threatening, rapid heart rhythm, it sends an electric shock to the heart. This can restore a normal rhythm. A WCD helps control abnormal heart rhythms.  You aren't likely to get a shock from the WCD, just as you aren't likely to use the air bag on a car. But it can save your life if it's needed.  Why is a WCD used?  You might get a WCD if you can't have an ICD (implantable cardioverter-defibrillator) or while you are waiting to have one put in. A WCD may also be used if you might have a high risk of sudden cardiac death for a short time.  How is it used?  You may wear the WCD for about 2 to 6 weeks or longer.  You will be measured so your vest will be the right size. The vest is worn under your clothes. It has electrodes and wires that lie against your skin. You wear a monitor around your waist or on a strap over your shoulder.  Wear the WCD at all times except when you bathe or swim. You can do your normal activities while you wear it.  Your doctor will show you how the WCD works and how to take care of it. Be sure to pay attention to alert sounds and messages on the monitor. You need to follow the monitor's instructions exactly.  You'll also get instructions for what to do after a shock.  What does it feel like?  The vest may feel uncomfortable, especially at first when you're not used to it. The shock delivered by the defibrillator may be painful.  Follow-up care is a key part of your treatment and safety. Be sure to make and go to all appointments, and call your doctor if you are having problems. It's also a good idea to know your test results and keep a list of the medicines you take.  Current as of: September 7, 2022               Content Version: 13.5  © 2006-2022 [employer].   Care instructions adapted under license by [employer]. If you have questions about a medical condition or this instruction, always ask your healthcare professional. [employer] disclaims any warranty or liability for your use of  this information. unable to tolerate solid food for 5 weeks
